# Patient Record
Sex: FEMALE | Race: ASIAN | URBAN - METROPOLITAN AREA
[De-identification: names, ages, dates, MRNs, and addresses within clinical notes are randomized per-mention and may not be internally consistent; named-entity substitution may affect disease eponyms.]

---

## 2022-09-06 ENCOUNTER — OFFICE VISIT (OUTPATIENT)
Dept: INTERNAL MEDICINE CLINIC | Facility: CLINIC | Age: 32
End: 2022-09-06
Payer: COMMERCIAL

## 2022-09-06 VITALS
BODY MASS INDEX: 29.78 KG/M2 | HEIGHT: 59.5 IN | DIASTOLIC BLOOD PRESSURE: 78 MMHG | OXYGEN SATURATION: 98 % | TEMPERATURE: 98 F | WEIGHT: 149.69 LBS | SYSTOLIC BLOOD PRESSURE: 124 MMHG | HEART RATE: 85 BPM

## 2022-09-06 DIAGNOSIS — J45.21 MILD INTERMITTENT ASTHMA WITH EXACERBATION: Primary | ICD-10-CM

## 2022-09-06 PROCEDURE — 99203 OFFICE O/P NEW LOW 30 MIN: CPT | Performed by: INTERNAL MEDICINE

## 2022-09-06 PROCEDURE — 3008F BODY MASS INDEX DOCD: CPT | Performed by: INTERNAL MEDICINE

## 2022-09-06 PROCEDURE — 3074F SYST BP LT 130 MM HG: CPT | Performed by: INTERNAL MEDICINE

## 2022-09-06 PROCEDURE — 3078F DIAST BP <80 MM HG: CPT | Performed by: INTERNAL MEDICINE

## 2022-09-06 RX ORDER — FLUTICASONE FUROATE AND VILANTEROL 100; 25 UG/1; UG/1
1 POWDER RESPIRATORY (INHALATION) DAILY
COMMUNITY
Start: 2021-04-09 | End: 2022-09-06

## 2022-09-06 RX ORDER — MONTELUKAST SODIUM 10 MG/1
10 TABLET ORAL NIGHTLY
COMMUNITY
Start: 2022-06-03

## 2022-09-06 RX ORDER — LISINOPRIL 5 MG/1
5 TABLET ORAL DAILY
COMMUNITY
Start: 2022-04-01

## 2022-09-06 RX ORDER — ACYCLOVIR 400 MG/1
1 TABLET ORAL DAILY
COMMUNITY

## 2022-09-06 RX ORDER — FERROUS SULFATE 325(65) MG
1 TABLET ORAL 2 TIMES DAILY
COMMUNITY
Start: 2021-12-04 | End: 2022-12-04

## 2022-09-06 RX ORDER — LISINOPRIL 5 MG/1
5 TABLET ORAL DAILY
COMMUNITY
Start: 2022-08-06 | End: 2023-08-06

## 2022-09-06 RX ORDER — MONTELUKAST SODIUM 10 MG/1
10 TABLET ORAL NIGHTLY
COMMUNITY
Start: 2021-04-18 | End: 2022-09-06

## 2022-09-06 RX ORDER — FLUTICASONE FUROATE AND VILANTEROL 100; 25 UG/1; UG/1
1 POWDER RESPIRATORY (INHALATION) DAILY
Qty: 1 EACH | Refills: 2 | Status: SHIPPED | OUTPATIENT
Start: 2022-09-06

## 2022-09-06 RX ORDER — ALBUTEROL SULFATE 90 UG/1
2 AEROSOL, METERED RESPIRATORY (INHALATION) EVERY 4 HOURS PRN
COMMUNITY
Start: 2021-12-02

## 2022-09-06 RX ORDER — MONTELUKAST SODIUM 10 MG/1
10 TABLET ORAL NIGHTLY
Qty: 90 TABLET | Refills: 0 | Status: SHIPPED | OUTPATIENT
Start: 2022-09-06

## 2022-09-06 RX ORDER — ALBUTEROL SULFATE 1.25 MG/3ML
1 SOLUTION RESPIRATORY (INHALATION) AS NEEDED
COMMUNITY

## 2022-09-06 RX ORDER — CIPROFLOXACIN 250 MG/1
250 TABLET, FILM COATED ORAL EVERY 12 HOURS
COMMUNITY
Start: 2022-06-03

## 2022-09-08 ENCOUNTER — TELEPHONE (OUTPATIENT)
Dept: INTERNAL MEDICINE CLINIC | Facility: CLINIC | Age: 32
End: 2022-09-08

## 2022-09-08 DIAGNOSIS — R05.1 ACUTE COUGH: Primary | ICD-10-CM

## 2022-09-08 RX ORDER — AZITHROMYCIN 250 MG/1
TABLET, FILM COATED ORAL
Qty: 6 TABLET | Refills: 0 | Status: SHIPPED | OUTPATIENT
Start: 2022-09-08 | End: 2022-09-13

## 2022-09-08 NOTE — TELEPHONE ENCOUNTER
Spoke to patient (name and  of patient verified). She reports she has been taking Mucinex, cough syrup, and the inhaler as prescribed with no improvement in her symptoms. She reports she is still coughing up yellow phlegm. She denies fever. The 22 Covid PCR test was negative. She did a Covid home test yesterday and today, both negative. Patient reports she uses a humidifier, air purifier, hot steam showers, holds her face over boiling steaming water with eucalyptus oil. Patient also reports elevating her head at night with three pillows. Dr. Ana Maria Lai, please advise any recommendations for patient's persistent symptoms with negative Covid tests.

## 2022-11-08 RX ORDER — LISINOPRIL 5 MG/1
TABLET ORAL
Qty: 90 TABLET | Refills: 0 | Status: SHIPPED | OUTPATIENT
Start: 2022-11-08

## 2022-11-08 NOTE — TELEPHONE ENCOUNTER
Should come in for ov for ffup for her hypertension; I just saw her before for an acute/urgent visit .  She may need also her annual physical if not done in the alst 12 mosl

## 2022-11-09 NOTE — TELEPHONE ENCOUNTER
Left message for pt to call back to inform that Dr Shasta Dominguez refilled her blood pressure medicine but does want to see her in office for a physical exam, when she was seen in September it was for an acute visit and her blood pressure was not addressed.

## 2022-12-19 ENCOUNTER — TELEMEDICINE (OUTPATIENT)
Dept: INTERNAL MEDICINE CLINIC | Facility: CLINIC | Age: 32
End: 2022-12-19

## 2022-12-19 ENCOUNTER — NURSE TRIAGE (OUTPATIENT)
Dept: INTERNAL MEDICINE CLINIC | Facility: CLINIC | Age: 32
End: 2022-12-19

## 2022-12-19 DIAGNOSIS — U07.1 COVID-19 VIRUS INFECTION: Primary | ICD-10-CM

## 2022-12-19 DIAGNOSIS — J45.21 MILD INTERMITTENT ASTHMA WITH EXACERBATION: ICD-10-CM

## 2022-12-19 PROCEDURE — 99214 OFFICE O/P EST MOD 30 MIN: CPT | Performed by: INTERNAL MEDICINE

## 2022-12-19 RX ORDER — NIRMATRELVIR AND RITONAVIR 300-100 MG
KIT ORAL
Qty: 30 TABLET | Refills: 0 | Status: SHIPPED | OUTPATIENT
Start: 2022-12-19 | End: 2022-12-24

## 2022-12-20 ENCOUNTER — TELEPHONE (OUTPATIENT)
Dept: INTERNAL MEDICINE CLINIC | Facility: CLINIC | Age: 32
End: 2022-12-20

## 2022-12-20 NOTE — TELEPHONE ENCOUNTER
Stop paxlovid as advised. Continue sympotmatic treatment; ER if any worsening of symptoms. CDC has not recommended using zithromax againstr covid so we dont prescribve abx for covid whichi is a viral infection.

## 2022-12-20 NOTE — TELEPHONE ENCOUNTER
Patient calling ( identified name and  ) states took Paxlovid yesterday  ( 3 tablets at once ) at 8pm last night     Noticed within 10 minutes of taking the Paxlovid  that her face, chin , neck became red and itching, had hives  ( small bumps )  Took Zyrtec and received some relief     Today has slight redness to her neck, skin is very dry and peeling , bumps to skin have decreased    Denies SOB, no throat issues       States her COVID  Symptoms are slightly  improved since yesterday , has been taking Mucinex     Has not taken any further doses at this time, advised to NOT take any more doses of Paxlovid        ( Reports last time she had COVID  In  she had Z pack with good results  )        Allergies reviewed and pharmacy confirmed      Please advise and thank you.

## 2023-01-20 ENCOUNTER — OFFICE VISIT (OUTPATIENT)
Dept: INTERNAL MEDICINE CLINIC | Facility: CLINIC | Age: 33
End: 2023-01-20

## 2023-01-20 VITALS
HEART RATE: 89 BPM | SYSTOLIC BLOOD PRESSURE: 126 MMHG | BODY MASS INDEX: 28.66 KG/M2 | DIASTOLIC BLOOD PRESSURE: 76 MMHG | OXYGEN SATURATION: 99 % | HEIGHT: 60 IN | WEIGHT: 146 LBS

## 2023-01-20 DIAGNOSIS — J45.21 MILD INTERMITTENT ASTHMA WITH EXACERBATION: ICD-10-CM

## 2023-01-20 DIAGNOSIS — Z00.00 ANNUAL PHYSICAL EXAM: Primary | ICD-10-CM

## 2023-01-20 DIAGNOSIS — Z01.419 WELL FEMALE EXAM WITH ROUTINE GYNECOLOGICAL EXAM: ICD-10-CM

## 2023-01-20 DIAGNOSIS — R05.1 ACUTE COUGH: ICD-10-CM

## 2023-01-20 DIAGNOSIS — I10 PRIMARY HYPERTENSION: ICD-10-CM

## 2023-01-20 PROCEDURE — 3008F BODY MASS INDEX DOCD: CPT | Performed by: INTERNAL MEDICINE

## 2023-01-20 PROCEDURE — 99395 PREV VISIT EST AGE 18-39: CPT | Performed by: INTERNAL MEDICINE

## 2023-01-20 PROCEDURE — 3074F SYST BP LT 130 MM HG: CPT | Performed by: INTERNAL MEDICINE

## 2023-01-20 PROCEDURE — 3078F DIAST BP <80 MM HG: CPT | Performed by: INTERNAL MEDICINE

## 2023-01-20 RX ORDER — NIFEDIPINE 30 MG/1
30 TABLET, FILM COATED, EXTENDED RELEASE ORAL DAILY
Qty: 30 TABLET | Refills: 2 | Status: SHIPPED | OUTPATIENT
Start: 2023-01-20

## 2023-01-20 RX ORDER — CODEINE PHOSPHATE AND GUAIFENESIN 10; 100 MG/5ML; MG/5ML
5 SOLUTION ORAL EVERY 6 HOURS PRN
Qty: 120 ML | Refills: 0 | Status: SHIPPED | OUTPATIENT
Start: 2023-01-20

## 2023-01-20 RX ORDER — AZITHROMYCIN 250 MG/1
TABLET, FILM COATED ORAL
Qty: 6 TABLET | Refills: 0 | Status: SHIPPED | OUTPATIENT
Start: 2023-01-20 | End: 2023-01-25

## 2023-01-20 RX ORDER — FLUTICASONE FUROATE AND VILANTEROL 100; 25 UG/1; UG/1
1 POWDER RESPIRATORY (INHALATION) DAILY
Qty: 1 EACH | Refills: 2 | Status: SHIPPED | OUTPATIENT
Start: 2023-01-20

## 2023-03-15 ENCOUNTER — OFFICE VISIT (OUTPATIENT)
Dept: INTERNAL MEDICINE CLINIC | Facility: CLINIC | Age: 33
End: 2023-03-15

## 2023-03-15 ENCOUNTER — LAB ENCOUNTER (OUTPATIENT)
Dept: LAB | Age: 33
End: 2023-03-15
Attending: INTERNAL MEDICINE
Payer: COMMERCIAL

## 2023-03-15 ENCOUNTER — TELEPHONE (OUTPATIENT)
Dept: INTERNAL MEDICINE CLINIC | Facility: CLINIC | Age: 33
End: 2023-03-15

## 2023-03-15 VITALS
TEMPERATURE: 97 F | WEIGHT: 149.13 LBS | OXYGEN SATURATION: 99 % | DIASTOLIC BLOOD PRESSURE: 76 MMHG | HEART RATE: 84 BPM | SYSTOLIC BLOOD PRESSURE: 124 MMHG | HEIGHT: 60 IN | BODY MASS INDEX: 29.28 KG/M2

## 2023-03-15 DIAGNOSIS — R49.0 HOARSENESS: ICD-10-CM

## 2023-03-15 DIAGNOSIS — I10 PRIMARY HYPERTENSION: Primary | ICD-10-CM

## 2023-03-15 DIAGNOSIS — R00.2 PALPITATIONS: ICD-10-CM

## 2023-03-15 DIAGNOSIS — M54.2 NECK PAIN: ICD-10-CM

## 2023-03-15 DIAGNOSIS — Z00.00 ANNUAL PHYSICAL EXAM: ICD-10-CM

## 2023-03-15 LAB
ALBUMIN SERPL-MCNC: 3.9 G/DL (ref 3.4–5)
ALBUMIN/GLOB SERPL: 1 {RATIO} (ref 1–2)
ALP LIVER SERPL-CCNC: 57 U/L
ALT SERPL-CCNC: 32 U/L
ANION GAP SERPL CALC-SCNC: 8 MMOL/L (ref 0–18)
AST SERPL-CCNC: 20 U/L (ref 15–37)
ATRIAL RATE: 75 BPM
BASOPHILS # BLD AUTO: 0.05 X10(3) UL (ref 0–0.2)
BASOPHILS NFR BLD AUTO: 0.8 %
BILIRUB SERPL-MCNC: 0.3 MG/DL (ref 0.1–2)
BUN BLD-MCNC: 14 MG/DL (ref 7–18)
BUN/CREAT SERPL: 17.7 (ref 10–20)
CALCIUM BLD-MCNC: 9.7 MG/DL (ref 8.5–10.1)
CHLORIDE SERPL-SCNC: 105 MMOL/L (ref 98–112)
CHOLEST SERPL-MCNC: 184 MG/DL (ref ?–200)
CO2 SERPL-SCNC: 27 MMOL/L (ref 21–32)
CREAT BLD-MCNC: 0.79 MG/DL
DEPRECATED RDW RBC AUTO: 37.2 FL (ref 35.1–46.3)
EOSINOPHIL # BLD AUTO: 0.17 X10(3) UL (ref 0–0.7)
EOSINOPHIL NFR BLD AUTO: 2.7 %
ERYTHROCYTE [DISTWIDTH] IN BLOOD BY AUTOMATED COUNT: 13.7 % (ref 11–15)
EST. AVERAGE GLUCOSE BLD GHB EST-MCNC: 128 MG/DL (ref 68–126)
FASTING PATIENT LIPID ANSWER: YES
FASTING STATUS PATIENT QL REPORTED: YES
GFR SERPLBLD BASED ON 1.73 SQ M-ARVRAT: 102 ML/MIN/1.73M2 (ref 60–?)
GLOBULIN PLAS-MCNC: 3.8 G/DL (ref 2.8–4.4)
GLUCOSE BLD-MCNC: 101 MG/DL (ref 70–99)
HBA1C MFR BLD: 6.1 % (ref ?–5.7)
HCT VFR BLD AUTO: 36 %
HDLC SERPL-MCNC: 48 MG/DL (ref 40–59)
HGB BLD-MCNC: 11 G/DL
IMM GRANULOCYTES # BLD AUTO: 0.01 X10(3) UL (ref 0–1)
IMM GRANULOCYTES NFR BLD: 0.2 %
LDLC SERPL CALC-MCNC: 121 MG/DL (ref ?–100)
LYMPHOCYTES # BLD AUTO: 1.91 X10(3) UL (ref 1–4)
LYMPHOCYTES NFR BLD AUTO: 30.5 %
MCH RBC QN AUTO: 23 PG (ref 26–34)
MCHC RBC AUTO-ENTMCNC: 30.6 G/DL (ref 31–37)
MCV RBC AUTO: 75.3 FL
MONOCYTES # BLD AUTO: 0.42 X10(3) UL (ref 0.1–1)
MONOCYTES NFR BLD AUTO: 6.7 %
NEUTROPHILS # BLD AUTO: 3.7 X10 (3) UL (ref 1.5–7.7)
NEUTROPHILS # BLD AUTO: 3.7 X10(3) UL (ref 1.5–7.7)
NEUTROPHILS NFR BLD AUTO: 59.1 %
NONHDLC SERPL-MCNC: 136 MG/DL (ref ?–130)
OSMOLALITY SERPL CALC.SUM OF ELEC: 291 MOSM/KG (ref 275–295)
P AXIS: 0 DEGREES
P-R INTERVAL: 142 MS
PLATELET # BLD AUTO: 390 10(3)UL (ref 150–450)
POTASSIUM SERPL-SCNC: 4.2 MMOL/L (ref 3.5–5.1)
PROT SERPL-MCNC: 7.7 G/DL (ref 6.4–8.2)
Q-T INTERVAL: 388 MS
QRS DURATION: 82 MS
QTC CALCULATION (BEZET): 433 MS
R AXIS: 58 DEGREES
RBC # BLD AUTO: 4.78 X10(6)UL
SODIUM SERPL-SCNC: 140 MMOL/L (ref 136–145)
T AXIS: 40 DEGREES
TRIGL SERPL-MCNC: 80 MG/DL (ref 30–149)
TSI SER-ACNC: 0.65 MIU/ML (ref 0.36–3.74)
VENTRICULAR RATE: 75 BPM
VLDLC SERPL CALC-MCNC: 14 MG/DL (ref 0–30)
WBC # BLD AUTO: 6.3 X10(3) UL (ref 4–11)

## 2023-03-15 PROCEDURE — 93000 ELECTROCARDIOGRAM COMPLETE: CPT | Performed by: INTERNAL MEDICINE

## 2023-03-15 PROCEDURE — 99214 OFFICE O/P EST MOD 30 MIN: CPT | Performed by: INTERNAL MEDICINE

## 2023-03-15 PROCEDURE — 3078F DIAST BP <80 MM HG: CPT | Performed by: INTERNAL MEDICINE

## 2023-03-15 PROCEDURE — 90471 IMMUNIZATION ADMIN: CPT | Performed by: INTERNAL MEDICINE

## 2023-03-15 PROCEDURE — 83036 HEMOGLOBIN GLYCOSYLATED A1C: CPT

## 2023-03-15 PROCEDURE — 84443 ASSAY THYROID STIM HORMONE: CPT

## 2023-03-15 PROCEDURE — 3074F SYST BP LT 130 MM HG: CPT | Performed by: INTERNAL MEDICINE

## 2023-03-15 PROCEDURE — 85025 COMPLETE CBC W/AUTO DIFF WBC: CPT

## 2023-03-15 PROCEDURE — 36415 COLL VENOUS BLD VENIPUNCTURE: CPT

## 2023-03-15 PROCEDURE — 90677 PCV20 VACCINE IM: CPT | Performed by: INTERNAL MEDICINE

## 2023-03-15 PROCEDURE — 80061 LIPID PANEL: CPT

## 2023-03-15 PROCEDURE — 3008F BODY MASS INDEX DOCD: CPT | Performed by: INTERNAL MEDICINE

## 2023-03-15 PROCEDURE — 80053 COMPREHEN METABOLIC PANEL: CPT

## 2023-03-15 NOTE — TELEPHONE ENCOUNTER
Patient is calling and states that she called to schedule her echocardiogram and they didn't have any openings until May. She was informed they have the heart monitor in March. She wants to know if you want her to do the echo first or the heart monitor.

## 2023-03-19 ENCOUNTER — TELEPHONE (OUTPATIENT)
Dept: INTERNAL MEDICINE CLINIC | Facility: CLINIC | Age: 33
End: 2023-03-19

## 2023-03-19 DIAGNOSIS — D50.9 MICROCYTIC ANEMIA: Primary | ICD-10-CM

## 2023-03-20 ENCOUNTER — HOSPITAL ENCOUNTER (OUTPATIENT)
Dept: CV DIAGNOSTICS | Facility: HOSPITAL | Age: 33
Discharge: HOME | End: 2023-03-20
Attending: INTERNAL MEDICINE
Payer: COMMERCIAL

## 2023-03-20 DIAGNOSIS — R00.2 PALPITATIONS: ICD-10-CM

## 2023-03-20 PROCEDURE — 93243 EXT ECG>48HR<7D SCAN A/R: CPT | Performed by: INTERNAL MEDICINE

## 2023-03-20 PROCEDURE — 93242 EXT ECG>48HR<7D RECORDING: CPT | Performed by: INTERNAL MEDICINE

## 2023-04-24 ENCOUNTER — HOSPITAL ENCOUNTER (OUTPATIENT)
Dept: GENERAL RADIOLOGY | Age: 33
Discharge: HOME | End: 2023-04-24
Attending: NURSE PRACTITIONER
Payer: COMMERCIAL

## 2023-04-24 ENCOUNTER — TELEPHONE (OUTPATIENT)
Dept: INTERNAL MEDICINE CLINIC | Facility: CLINIC | Age: 33
End: 2023-04-24

## 2023-04-24 ENCOUNTER — OFFICE VISIT (OUTPATIENT)
Dept: INTERNAL MEDICINE CLINIC | Facility: CLINIC | Age: 33
End: 2023-04-24

## 2023-04-24 VITALS
RESPIRATION RATE: 16 BRPM | DIASTOLIC BLOOD PRESSURE: 85 MMHG | HEART RATE: 90 BPM | TEMPERATURE: 98 F | HEIGHT: 60 IN | SYSTOLIC BLOOD PRESSURE: 145 MMHG | OXYGEN SATURATION: 99 % | WEIGHT: 150 LBS | BODY MASS INDEX: 29.45 KG/M2

## 2023-04-24 DIAGNOSIS — I10 PRIMARY HYPERTENSION: ICD-10-CM

## 2023-04-24 DIAGNOSIS — J45.21 MILD INTERMITTENT ASTHMA WITH EXACERBATION: Primary | ICD-10-CM

## 2023-04-24 DIAGNOSIS — J45.21 MILD INTERMITTENT ASTHMA WITH EXACERBATION: ICD-10-CM

## 2023-04-24 PROCEDURE — 71046 X-RAY EXAM CHEST 2 VIEWS: CPT | Performed by: NURSE PRACTITIONER

## 2023-04-24 RX ORDER — AZITHROMYCIN 250 MG/1
TABLET, FILM COATED ORAL
Qty: 6 TABLET | Refills: 0 | Status: SHIPPED | OUTPATIENT
Start: 2023-04-24 | End: 2023-04-29

## 2023-04-24 RX ORDER — MONTELUKAST SODIUM 10 MG/1
10 TABLET ORAL NIGHTLY
Qty: 90 TABLET | Refills: 0 | Status: SHIPPED | OUTPATIENT
Start: 2023-04-24

## 2023-04-24 RX ORDER — FLUTICASONE FUROATE AND VILANTEROL 100; 25 UG/1; UG/1
1 POWDER RESPIRATORY (INHALATION) DAILY
Qty: 1 EACH | Refills: 1 | Status: SHIPPED | OUTPATIENT
Start: 2023-04-24

## 2023-04-24 NOTE — TELEPHONE ENCOUNTER
Pt has appointment in person today at 2pm.  Pt reports flu like symptoms started yesterday. Temp 99F, cough, runny nose. Covid test negative  Pt has been monitoring her heart rate and blood pressure. Yesterday 98/54, today 100/60. Heart rate  Highest 135. Hr now 98  No dizziness. Able to speak in complete sentences. Pt tries not to use her inhaler as much. Informed ok to keep appointment today. Pt verbalized understanding.     Future Appointments   Date Time Provider Stephan Villafana   4/24/2023  2:00 PM Juan Antonio Mccoy, [place]

## 2023-05-12 ENCOUNTER — OFFICE VISIT (OUTPATIENT)
Dept: INTERNAL MEDICINE CLINIC | Facility: CLINIC | Age: 33
End: 2023-05-12

## 2023-05-12 ENCOUNTER — HOSPITAL ENCOUNTER (OUTPATIENT)
Dept: GENERAL RADIOLOGY | Facility: HOSPITAL | Age: 33
Discharge: HOME | End: 2023-05-12
Attending: INTERNAL MEDICINE
Payer: COMMERCIAL

## 2023-05-12 VITALS
DIASTOLIC BLOOD PRESSURE: 86 MMHG | HEIGHT: 60 IN | BODY MASS INDEX: 29.41 KG/M2 | SYSTOLIC BLOOD PRESSURE: 132 MMHG | HEART RATE: 93 BPM | WEIGHT: 149.81 LBS | OXYGEN SATURATION: 97 %

## 2023-05-12 DIAGNOSIS — M79.671 RIGHT FOOT PAIN: Primary | ICD-10-CM

## 2023-05-12 DIAGNOSIS — M79.671 RIGHT FOOT PAIN: ICD-10-CM

## 2023-05-12 PROCEDURE — 3075F SYST BP GE 130 - 139MM HG: CPT | Performed by: INTERNAL MEDICINE

## 2023-05-12 PROCEDURE — 73630 X-RAY EXAM OF FOOT: CPT | Performed by: INTERNAL MEDICINE

## 2023-05-12 PROCEDURE — 99213 OFFICE O/P EST LOW 20 MIN: CPT | Performed by: INTERNAL MEDICINE

## 2023-05-12 PROCEDURE — 3079F DIAST BP 80-89 MM HG: CPT | Performed by: INTERNAL MEDICINE

## 2023-05-12 PROCEDURE — 3008F BODY MASS INDEX DOCD: CPT | Performed by: INTERNAL MEDICINE

## 2023-05-12 RX ORDER — NAPROXEN 500 MG/1
500 TABLET ORAL 2 TIMES DAILY WITH MEALS
Qty: 30 TABLET | Refills: 0 | Status: SHIPPED | OUTPATIENT
Start: 2023-05-12 | End: 2024-05-06

## 2023-05-12 NOTE — PATIENT INSTRUCTIONS
Await results of right foot x-ray. Please rest and apply ice to your right foot 2-3 times daily, and take naproxen 500 mg twice daily with meals. Call if no better.

## 2023-05-23 ENCOUNTER — OFFICE VISIT (OUTPATIENT)
Dept: OBGYN CLINIC | Facility: CLINIC | Age: 33
End: 2023-05-23

## 2023-05-23 VITALS
BODY MASS INDEX: 29 KG/M2 | SYSTOLIC BLOOD PRESSURE: 134 MMHG | DIASTOLIC BLOOD PRESSURE: 90 MMHG | HEART RATE: 84 BPM | WEIGHT: 148 LBS

## 2023-05-23 DIAGNOSIS — Z01.411 ENCOUNTER FOR GYNECOLOGICAL EXAMINATION WITH ABNORMAL FINDING: Primary | ICD-10-CM

## 2023-05-23 DIAGNOSIS — N63.11 MASS OF UPPER OUTER QUADRANT OF RIGHT BREAST: ICD-10-CM

## 2023-05-23 PROBLEM — Z01.419 WELL FEMALE EXAM WITH ROUTINE GYNECOLOGICAL EXAM: Status: RESOLVED | Noted: 2023-01-20 | Resolved: 2023-05-23

## 2023-05-23 PROCEDURE — 3075F SYST BP GE 130 - 139MM HG: CPT | Performed by: OBSTETRICS & GYNECOLOGY

## 2023-05-23 PROCEDURE — 3080F DIAST BP >= 90 MM HG: CPT | Performed by: OBSTETRICS & GYNECOLOGY

## 2023-05-23 PROCEDURE — 99385 PREV VISIT NEW AGE 18-39: CPT | Performed by: OBSTETRICS & GYNECOLOGY

## 2023-05-23 NOTE — PROGRESS NOTES
Robyn Ying is a 33year old female [facility] Patient's last menstrual period was 2023 (exact date). Patient presents with:  Gyn Exam: NP, Annual -- thinking of IVF in  next 6-12 months -- female partner  . OBSTETRICS HISTORY:     OB History    Para Term  AB Living   0 0 0 0 0 0   SAB IAB Ectopic Multiple Live Births   0 0 0 0 0       GYNE HISTORY:     Periods regular monthly      Sexual activity:   Not on file        Pap Date: 21  Pap Result Notes: PAP NEG           View : No data to display. MEDICAL HISTORY:     Past Medical History:   Diagnosis Date    Allergic rhinitis     Asthma     Essential hypertension     Hyperlipidemia     Prediabetes      No past surgical history on file. OB History     T0    L0    SAB0  IAB0  Ectopic0  Multiple0  Live Births0      SOCIAL HISTORY:     Tobacco Use: Medium Risk (2023)      Patient History          Smoking Tobacco Use: Former          Smokeless Tobacco Use: Never          Passive Exposure: Not on file    FAMILY HISTORY:     Family History   Problem Relation Age of Onset    Lipids Father     Hypertension Father     Lipids Mother     Hypertension Mother     Diabetes Brother     Heart Disorder Brother         CAD    Diabetes Maternal Aunt          MEDICATIONS:       Current Outpatient Medications:     naproxen 500 MG Oral Tab, Take 1 tablet (500 mg total) by mouth 2 (two) times daily with meals. , Disp: 30 tablet, Rfl: 0    fluticasone furoate-vilanterol 100-25 MCG/ACT Inhalation Aerosol Powder, Breath Activated, Inhale 1 puff into the lungs daily. , Disp: 1 each, Rfl: 1    montelukast 10 MG Oral Tab, Take 1 tablet (10 mg total) by mouth nightly. TAKE AT BEDTIME, Disp: 90 tablet, Rfl: 0    NIFEdipine ER 30 MG Oral Tablet 24 Hr, Take 1 tablet (30 mg total) by mouth daily. , Disp: 30 tablet, Rfl: 2    Albuterol Sulfate 1.25 MG/3ML Inhalation Nebu Soln, 3 mL (1.25 mg total) as needed. , Disp: , Rfl: albuterol 108 (90 Base) MCG/ACT Inhalation Aero Soln, Inhale 2 puffs into the lungs every 4 (four) hours as needed. , Disp: , Rfl:     Cholecalciferol 25 MCG (1000 UT) Oral Tab, Take 1 tablet (1,000 Units total) by mouth daily. , Disp: , Rfl:     ALLERGIES:       Amoxicillin             HIVES, ITCHING, RASH, SWELLING,                            WHEEZING  Cat Hair Extract        Coughing, ITCHING, RASH, SHORTNESS                            OF BREATH, SWELLING, WHEEZING  Latex                   ITCHING, RASH, SWELLING  Shrimp Flavor           SWELLING    Comment:Some seafood  Seafood                 ITCHING, RASH      REVIEW OF SYSTEMS:     Constitutional:    denies fever / chills  Eyes:     denies blurred or double vision  Cardiovascular:  denies chest pain or palpitations  Respiratory:    denies shortness of breath  Gastrointestinal:  denies severe abdominal pain, frequent diarrhea or constipation, nausea / vomiting  Genitourinary:    denies dysuria, bothersome incontinence  Skin/Breast:   denies any breast pain, lumps, or discharge  Neurological:    denies frequent severe headaches  Psychiatric:   denies depression or anxiety, thoughts of harming self or others  Heme/Lymph:    denies easy bruising or bleeding      PHYSICAL EXAM:   Blood pressure 134/90, pulse 84, weight 148 lb (67.1 kg), last menstrual period 05/03/2023. Constitutional:  well developed, well nourished  Head/Face:  normocephalic  Neck/Thyroid: thyroid symmetric, no thyromegaly, no nodules, no adenopathy  Lymphatic: no abnormal supraclavicular or axillary adenopathy is noted  Breast:   normal without palpable masses, tenderness, asymmetry, nipple discharge, nipple retraction or skin changes (+) vague nodule     Abdomen:   soft, nontender, nondistended, no masses  Skin/Hair:  no unusual rashes or bruises  Extremities:  no edema, no cyanosis  Psychiatric:   oriented to time, place, person and situation.  Appropriate mood and affect    Pelvic Exam:  External Genitalia:  normal appearance, hair distribution, and no lesions  Urethral Meatus:   normal in size, location, without lesions and prolapse  Bladder:    no fullness, masses or tenderness  Vagina:    normal appearance without lesions, no abnormal discharge  Cervix:    normal without tenderness on motion  Uterus:    normal in size, contour, position, mobility, without tenderness  Adnexa:   normal without masses or tenderness  Perineum:   normal  Anus: no hemorroids         ASSESSMENT & PLAN:     Carl Mccartney was seen today for gyn exam.    Diagnoses and all orders for this visit:    Encounter for gynecological examination with abnormal finding    Mass of upper outer quadrant of right breast    Breast lump may be due to upcoming period. Recheck in 2 wks    SUMMARY:  Pap: Next cotest 4/24-26 per ASCCP guidelines. BCM:  None  STD screening: declines  Mammogram: n/a -- once 37 yrs old  HM updated  Depression screen:   Depression Screening (PHQ-2/PHQ-9): Over the LAST 2 WEEKS   Little interest or pleasure in doing things: Not at all    Feeling down, depressed, or hopeless: Not at all    PHQ-2 SCORE: 0          FOLLOW-UP     Return in about 2 weeks (around 6/6/2023) for breast exam.    Note to patient and family:  The [facility] makes medical notes available to patients in the interest of transparency. However, please be advised that this is a medical document. It is intended as a peer to peer communication. It is written in medical language and may contain abbreviations or verbiage that are technical and unfamiliar. It may appear blunt or direct. Medical documents are intended to carry relevant information, facts as evident, and the clinical opinion of the practitioner.

## 2023-06-02 ENCOUNTER — HOSPITAL ENCOUNTER (OUTPATIENT)
Dept: CV DIAGNOSTICS | Facility: HOSPITAL | Age: 33
Discharge: HOME | End: 2023-06-02
Attending: INTERNAL MEDICINE
Payer: COMMERCIAL

## 2023-06-02 DIAGNOSIS — R00.2 PALPITATIONS: ICD-10-CM

## 2023-06-02 PROCEDURE — 93306 TTE W/DOPPLER COMPLETE: CPT | Performed by: INTERNAL MEDICINE

## 2023-06-06 NOTE — TELEPHONE ENCOUNTER
Spoke with pt, verified , informed of Dr Angelica Malloy message below, pt verbalized understanding. 4 = No assist / stand by assistance

## 2023-07-03 RX ORDER — NIFEDIPINE 30 MG/1
30 TABLET, FILM COATED, EXTENDED RELEASE ORAL DAILY
Qty: 30 TABLET | Refills: 2 | Status: CANCELLED | OUTPATIENT
Start: 2023-07-03

## 2023-07-04 ENCOUNTER — TELEPHONE (OUTPATIENT)
Dept: INTERNAL MEDICINE CLINIC | Facility: CLINIC | Age: 33
End: 2023-07-04

## 2023-07-04 NOTE — TELEPHONE ENCOUNTER
Please review; protocol failed/no protocol. Requested Prescriptions   Pending Prescriptions Disp Refills    NIFEDIPINE ER 30 MG Oral Tablet 24 Hr [Pharmacy Med Name: Nifedipine Er 24hr 30 Mg Tab Yolis] 30 tablet 0     Sig: Take 1 tablet (30 mg total) by mouth daily.        Hypertensive Medications Protocol Failed - 7/3/2023  5:31 PM        Failed - Last BP reading less than 140/90     BP Readings from Last 1 Encounters:  05/23/23 : 134/90              Passed - In person appointment in the past 12 or next 3 months     Recent Outpatient Visits              1 month ago Encounter for gynecological examination with abnormal finding    [address], Kaleigh So MD    Office Visit    1 month ago Right foot pain    [address] [facility], Radha Lu MD    Office Visit    2 months ago Mild intermittent asthma with exacerbation    [facility], Ariana Durand, [place], [facility]    Office Visit    3 months ago Primary hypertension    Neil Grijalva MD    Office Visit    5 months ago Annual physical exam    Charles Grijalva MD    Office Visit                      Passed - CMP or BMP in past 6 months     Recent Results (from the past 4392 hour(s))   COMP METABOLIC PANEL (14)    Collection Time: 03/15/23  9:22 AM   Result Value Ref Range    Glucose 101 (H) 70 - 99 mg/dL    Sodium 140 136 - 145 mmol/L    Potassium 4.2 3.5 - 5.1 mmol/L    Chloride 105 98 - 112 mmol/L    CO2 27.0 21.0 - 32.0 mmol/L    Anion Gap 8 0 - 18 mmol/L    BUN 14 7 - 18 mg/dL    Creatinine 0.79 0.55 - 1.02 mg/dL    BUN/CREA Ratio 17.7 10.0 - 20.0    Calcium, Total 9.7 8.5 - 10.1 mg/dL    Calculated Osmolality 291 275 - 295 mOsm/kg    eGFR-Cr 102 >=60 mL/min/1.73m2    ALT 32 13 - 56 U/L    AST 20 15 - 37 U/L    Alkaline Phosphatase 57 37 - 98 U/L    Bilirubin, Total 0.3 0.1 - 2.0 mg/dL    Total Protein 7.7 6.4 - 8.2 g/dL    Albumin 3.9 3.4 - 5.0 g/dL    Globulin  3.8 2.8 - 4.4 g/dL    A/G Ratio 1.0 1.0 - 2.0    Patient Fasting for CMP? Yes      *Note: Due to a large number of results and/or encounters for the requested time period, some results have not been displayed. A complete set of results can be found in Results Review.                Passed - In person appointment or virtual visit in the past 6 months     Recent Outpatient Visits              1 month ago Encounter for gynecological examination with abnormal finding    [address] Virginia Schaeffer MD    Office Visit    1 month ago Right foot pain    Vince Rodriguez MD    Office Visit    2 months ago Mild intermittent asthma with exacerbation    [facility]    Office Visit    3 months ago Primary hypertension    [address] [place] [address], Jaleesa Cordero MD    Office Visit    5 months ago Annual physical exam    Doir Mcclain MD    Office Visit                      Passed - EGFRCR or GFRNAA > 50     GFR Evaluation  EGFRCR: 102 , resulted on 3/15/2023                Recent Outpatient Visits              1 month ago Encounter for gynecological examination with abnormal finding    [address] [facility],[address], [address] Virginia Schaeffer MD    Office Visit    1 month ago Right foot pain    Vince Rodriguez MD    Office Visit    2 months ago Mild intermittent asthma with exacerbation    [facility], [facility]    Office Visit    3 months ago Primary hypertension    [facility] Jenniffer Choudhury MD    Office Visit    5 months ago Annual physical exam    [address], Zheng Childers MD    Office Visit

## 2023-07-05 RX ORDER — NIFEDIPINE 30 MG/1
30 TABLET, FILM COATED, EXTENDED RELEASE ORAL DAILY
Qty: 90 TABLET | Refills: 0 | Status: SHIPPED | OUTPATIENT
Start: 2023-07-05

## 2023-07-06 ENCOUNTER — PATIENT OUTREACH (OUTPATIENT)
Dept: CASE MANAGEMENT | Age: 33
End: 2023-07-06

## 2023-07-06 NOTE — PROCEDURES
The office order for PCP removal request is Approved and finalized on July 6, 2023.     Thanks,  [facility] [employer]

## 2023-09-02 ENCOUNTER — OFFICE VISIT (OUTPATIENT)
Dept: INTERNAL MEDICINE CLINIC | Facility: CLINIC | Age: 33
End: 2023-09-02

## 2023-09-02 VITALS
WEIGHT: 145.88 LBS | SYSTOLIC BLOOD PRESSURE: 128 MMHG | DIASTOLIC BLOOD PRESSURE: 80 MMHG | HEART RATE: 80 BPM | TEMPERATURE: 98 F | BODY MASS INDEX: 28.64 KG/M2 | HEIGHT: 60 IN | OXYGEN SATURATION: 99 %

## 2023-09-02 DIAGNOSIS — M79.671 RIGHT FOOT PAIN: Primary | ICD-10-CM

## 2023-09-02 DIAGNOSIS — J01.90 ACUTE NON-RECURRENT SINUSITIS, UNSPECIFIED LOCATION: ICD-10-CM

## 2023-09-02 PROCEDURE — 99213 OFFICE O/P EST LOW 20 MIN: CPT | Performed by: INTERNAL MEDICINE

## 2023-09-02 PROCEDURE — 3008F BODY MASS INDEX DOCD: CPT | Performed by: INTERNAL MEDICINE

## 2023-09-02 PROCEDURE — 3074F SYST BP LT 130 MM HG: CPT | Performed by: INTERNAL MEDICINE

## 2023-09-02 PROCEDURE — 3079F DIAST BP 80-89 MM HG: CPT | Performed by: INTERNAL MEDICINE

## 2023-10-06 RX ORDER — NIFEDIPINE 30 MG/1
30 TABLET, FILM COATED, EXTENDED RELEASE ORAL DAILY
Qty: 90 TABLET | Refills: 1 | Status: SHIPPED | OUTPATIENT
Start: 2023-10-06

## 2023-10-06 NOTE — TELEPHONE ENCOUNTER
Please Review. Protocol Failed or has no protocol.       Requested Prescriptions   Pending Prescriptions Disp Refills    NIFEDIPINE ER 30 MG Oral Tablet 24 Hr [Pharmacy Med Name: Nifedipine Er 24hr 30 Mg Tab Yolis] 90 tablet 0     Sig: Take 1 tablet (30 mg total) by mouth daily.       Hypertensive Medications Protocol Failed - 10/4/2023  9:40 AM        Failed - CMP or BMP in past 6 months     No results found for this or any previous visit (from the past 4392 hour(s)).            Passed - In person appointment in the past 12 or next 3 months     Recent Outpatient Visits              1 month ago Right foot pain    [facility] Sarbjit De La Rosa MD    Office Visit    4 months ago Encounter for gynecological examination with abnormal finding    [facility] Jazmine Mercedes MD    Office Visit    4 months ago Right foot pain    [facility] Jeffrey Wakefield MD    Office Visit    5 months ago Mild intermittent asthma with exacerbation    [facility] Reba Ross APRN    Office Visit    6 months ago Primary hypertension    [facility] Sarbjit De La Rosa MD    Office Visit          Future Appointments         Provider Department Appt Notes    In 3 weeks Rosy Grayson DPM [facility] RT FOOT PAIN                    Passed - Last BP reading less than 140/90     BP Readings from Last 1 Encounters:  09/02/23 : 128/80              Passed - In person appointment or virtual visit in the past 6 months     Recent Outpatient Visits              1 month ago Right foot pain    [facility] Sarbjit De La Rosa MD    Office Visit    4 months ago Encounter for gynecological examination with abnormal finding     [facility] - [facility] Jazmine Mercedes MD    Office Visit    4 months ago Right foot pain    [facility] Jeffrey Wakefield MD    Office Visit    5 months ago Mild intermittent asthma with exacerbation    [facility] Reba Ross APRN    Office Visit    6 months ago Primary hypertension    [facility] Sarbjit De La Rosa MD    Office Visit          Future Appointments         Provider Department Appt Notes    In 3 weeks Rosy Grayson DPM [facility] RT FOOT PAIN                    Passed - EGFRCR or GFRNAA > 50     GFR Evaluation  EGFRCR: 102 , resulted on 3/15/2023             Recent Outpatient Visits              1 month ago Right foot pain    [facility] Sarbjit De La Rosa MD    Office Visit    4 months ago Encounter for gynecological examination with abnormal finding    [facility] - [facility] Jazmine Mercedes MD    Office Visit    4 months ago Right foot pain    [facility] Jeffrey Wakefield MD    Office Visit    5 months ago Mild intermittent asthma with exacerbation    [facility] Reba Ross APRN    Office Visit    6 months ago Primary hypertension    [facility] Sarbjit De La Rosa MD    Office Visit          Future Appointments         Provider Department Appt Notes    In 3 weeks Rosy Grayson DPM [facility] RT FOOT PAIN

## 2023-10-27 ENCOUNTER — OFFICE VISIT (OUTPATIENT)
Dept: PODIATRY CLINIC | Facility: CLINIC | Age: 33
End: 2023-10-27

## 2023-10-27 DIAGNOSIS — M79.671 RIGHT FOOT PAIN: Primary | ICD-10-CM

## 2023-10-27 PROCEDURE — 99203 OFFICE O/P NEW LOW 30 MIN: CPT | Performed by: STUDENT IN AN ORGANIZED HEALTH CARE EDUCATION/TRAINING PROGRAM

## 2023-10-27 RX ORDER — METHYLPREDNISOLONE 4 MG/1
TABLET ORAL
Qty: 21 TABLET | Refills: 0 | Status: SHIPPED | OUTPATIENT
Start: 2023-10-27

## 2023-10-27 NOTE — PROGRESS NOTES
[address] [facility] Podiatry  Progress Note      Renetta Valente is a 33year old female. Patient presents with: Foot Pain: R  foot- Pt  has been having pain, onset 2 years. Denies any injury. Pt has xray and sates she might have a bone spur. Rates pain 4/10. Denies numbness or tingling. HPI:     Patient is a pleasant 50-year-old female who presents to clinic today for evaluation of right foot pain. She admits to pain on the right dorsal lateral midfoot with palpation pressure. Patient had x-rays done which show a posterior calcaneal spur. Patient has had some physical therapy treatments consisting of stretching and ultrasound modalities which have helped somewhat. She takes naproxen for pain as needed    Allergies: Amoxicillin, Cat Hair Extract, Latex, Shrimp Flavor, and Seafood    Current Outpatient Medications   Medication Sig Dispense Refill    methylPREDNISolone 4 MG Oral Tablet Therapy Pack Take per package insert (instructions). Take as directed on the box 21 tablet 0    NIFEdipine ER 30 MG Oral Tablet 24 Hr Take 1 tablet (30 mg total) by mouth daily. 90 tablet 1    naproxen 500 MG Oral Tab Take 1 tablet (500 mg total) by mouth 2 (two) times daily with meals. 30 tablet 0    montelukast 10 MG Oral Tab Take 1 tablet (10 mg total) by mouth nightly. TAKE AT BEDTIME 90 tablet 0    Albuterol Sulfate 1.25 MG/3ML Inhalation Nebu Soln 3 mL (1.25 mg total) as needed. albuterol 108 (90 Base) MCG/ACT Inhalation Aero Soln Inhale 2 puffs into the lungs every 4 (four) hours as needed. Cholecalciferol 25 MCG (1000 UT) Oral Tab Take 1 tablet (1,000 Units total) by mouth daily. fluticasone furoate-vilanterol 100-25 MCG/ACT Inhalation Aerosol Powder, Breath Activated Inhale 1 puff into the lungs daily.  (Patient not taking: Reported on 9/2/2023) 1 each 1      Past Medical History:   Diagnosis Date    Allergic rhinitis     Asthma     Essential hypertension     Hyperlipidemia     Prediabetes No past surgical history on file. Family History   Problem Relation Age of Onset    Lipids Father     Hypertension Father     Lipids Mother     Hypertension Mother     Diabetes Brother     Heart Disorder Brother         CAD    Diabetes Maternal Aunt       Social History    Socioeconomic History      Marital status: Single    Tobacco Use      Smoking status: Former        Types: Cigarettes        Passive exposure: Past      Smokeless tobacco: Never      Tobacco comments: social smoking    Vaping Use      Vaping Use: Some days    Substance and Sexual Activity      Alcohol use: Yes        Comment: socially       Drug use: Never          REVIEW OF SYSTEMS:     Denies nause, fever, chills  No calf pain  Denies chest pain or SOB      EXAM:   LMP 08/17/2023 (Exact Date)   GENERAL: well developed, well nourished, in no apparent distress  EXTREMITIES:   1. Integument: Normal skin temperature and turgor  2. Vascular: Dorsalis pedis two out of four bilateral and posterior tibial pulses two out of   four bilateral, capillary refill normal.   3. Musculoskeletal: All muscle groups are graded 5 out of 5 in the foot and ankle. No pain with palpation to posterior medial calcaneal tubercle or posterior calcaneus. Pain with palpation along fourth metatarsal   4. Neurological: Normal sharp dull sensation; reflexes normal.             ASSESSMENT AND PLAN:   Diagnoses and all orders for this visit:    Right foot pain  -     MRI FOOT (W+WO), RIGHT (CPT=73720); Future    Other orders  -     methylPREDNISolone 4 MG Oral Tablet Therapy Pack; Take per package insert (instructions).  Take as directed on the box        Plan:     -Patient seen and examined and findings discussed with patient.  -Discussed etiology of condition along with treatment options with patient  -Reviewed x-rays of right foot with patient and went over radiology of condition  -Prescribed Medrol Dosepak to be taken as directed  -Advised patient to walk in a supportive shoe and to avoid high-impact activities at this time  -Continue physical therapy  -Patient to schedule an MRI for right foot if no improvement to right foot pain for her obtain MRI  -We will reevaluate in 3 weeks          The patient indicates understanding of these issues and agrees to the plan.         Radha Chahal DPM

## 2023-11-10 ENCOUNTER — OFFICE VISIT (OUTPATIENT)
Dept: INTERNAL MEDICINE CLINIC | Facility: CLINIC | Age: 33
End: 2023-11-10

## 2023-11-10 VITALS
TEMPERATURE: 98 F | SYSTOLIC BLOOD PRESSURE: 120 MMHG | OXYGEN SATURATION: 98 % | HEIGHT: 60 IN | WEIGHT: 146.5 LBS | BODY MASS INDEX: 28.76 KG/M2 | DIASTOLIC BLOOD PRESSURE: 66 MMHG | HEART RATE: 105 BPM

## 2023-11-10 DIAGNOSIS — S16.1XXA STRAIN OF NECK MUSCLE, INITIAL ENCOUNTER: Primary | ICD-10-CM

## 2023-11-10 DIAGNOSIS — S39.012A STRAIN OF LUMBAR REGION, INITIAL ENCOUNTER: ICD-10-CM

## 2023-11-10 DIAGNOSIS — Z31.41 FERTILITY TESTING: ICD-10-CM

## 2023-11-10 PROCEDURE — 3074F SYST BP LT 130 MM HG: CPT | Performed by: INTERNAL MEDICINE

## 2023-11-10 PROCEDURE — 3078F DIAST BP <80 MM HG: CPT | Performed by: INTERNAL MEDICINE

## 2023-11-10 PROCEDURE — 3008F BODY MASS INDEX DOCD: CPT | Performed by: INTERNAL MEDICINE

## 2023-11-10 PROCEDURE — 99214 OFFICE O/P EST MOD 30 MIN: CPT | Performed by: INTERNAL MEDICINE

## 2023-11-10 RX ORDER — CYCLOBENZAPRINE HCL 10 MG
10 TABLET ORAL NIGHTLY
Qty: 14 TABLET | Refills: 0 | Status: SHIPPED | OUTPATIENT
Start: 2023-11-10
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
## 2023-11-24 ENCOUNTER — NURSE TRIAGE (OUTPATIENT)
Dept: INTERNAL MEDICINE CLINIC | Facility: CLINIC | Age: 33
End: 2023-11-24

## 2023-11-24 NOTE — TELEPHONE ENCOUNTER
Action Requested: Summary for Provider     []  Critical Lab, Recommendations Needed  [] Need Additional Advice  []   FYI    []   Need Orders  [] Need Medications Sent to Pharmacy  []  Other     SUMMARY: Patient sent to ER. Reason for call: Rapid Heart Beat  Onset:   -125; feels dizzy. For a week. feels neck pain/tightness. Aggravating factors: not able to determine if stress. In the past, she has had these episodes now and then. But now more frequently and constant. Per protocol, ER advised. Patient verbalized understanding. F/u appt given Monday.    Reason for Disposition   Dizziness, lightheadedness, or weakness    Protocols used: Heart Rate and Heartbeat [id number]

## 2023-11-30 ENCOUNTER — HOSPITAL ENCOUNTER (OUTPATIENT)
Dept: GENERAL RADIOLOGY | Age: 33
Discharge: HOME | End: 2023-11-30
Attending: STUDENT IN AN ORGANIZED HEALTH CARE EDUCATION/TRAINING PROGRAM
Payer: COMMERCIAL

## 2023-11-30 ENCOUNTER — HOSPITAL ENCOUNTER (OUTPATIENT)
Dept: MRI IMAGING | Age: 33
Discharge: HOME | End: 2023-11-30
Attending: STUDENT IN AN ORGANIZED HEALTH CARE EDUCATION/TRAINING PROGRAM
Payer: COMMERCIAL

## 2023-11-30 DIAGNOSIS — M79.671 RIGHT FOOT PAIN: ICD-10-CM

## 2023-11-30 PROCEDURE — 73720 MRI LWR EXTREMITY W/O&W/DYE: CPT | Performed by: STUDENT IN AN ORGANIZED HEALTH CARE EDUCATION/TRAINING PROGRAM

## 2023-11-30 PROCEDURE — A9575 INJ GADOTERATE MEGLUMI 0.1ML: HCPCS | Performed by: STUDENT IN AN ORGANIZED HEALTH CARE EDUCATION/TRAINING PROGRAM

## 2023-11-30 RX ORDER — GADOTERATE MEGLUMINE 376.9 MG/ML
15 INJECTION INTRAVENOUS
Status: COMPLETED | OUTPATIENT
Start: 2023-11-30 | End: 2023-11-30

## 2023-11-30 RX ADMIN — GADOTERATE MEGLUMINE 13 ML: 376.9 INJECTION INTRAVENOUS at 10:25:00

## 2023-12-11 ENCOUNTER — TELEPHONE (OUTPATIENT)
Dept: PODIATRY CLINIC | Facility: CLINIC | Age: 33
End: 2023-12-11

## 2023-12-11 DIAGNOSIS — M79.671 RIGHT FOOT PAIN: Primary | ICD-10-CM

## 2023-12-11 RX ORDER — IBUPROFEN 600 MG/1
600 TABLET ORAL EVERY 8 HOURS
Qty: 90 TABLET | Refills: 0 | Status: SHIPPED | OUTPATIENT
Start: 2023-12-11

## 2023-12-11 NOTE — TELEPHONE ENCOUNTER
S/w patient- Patient was seen on 10/27/23 for right foot pain. Patient was given steroid pack at that time which she says was moderately helpful for a few weeks. She completed MRI that we ordered on 11/30/23. States that she still is in a lot of pain and the first appointment that she could schedule was 12/29/23 for MRI results. She states that she is icing, elevating and using ibuprofen with minimal relief. 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 She was wondering if Dr Owen Palacios would send any additional medications for pain and if she could have note to do desk duty until her follow up on 12/29/23.     Please see MRI results and advise on recommendations for patient

## 2023-12-11 NOTE — TELEPHONE ENCOUNTER
Patient calling has questions per pt she's having foot severe pain. Would like to know if Dr can prescribe medication in meantime till her upcoming appt .

## 2023-12-11 NOTE — TELEPHONE ENCOUNTER
S/w patient- Booked her for appointment on 12/15/23 for 8am. Letter created for patient and sent through [employer]. Patient states that she bought a CAM boot and may start wearing it to help with pain. I told her to bring whatever boot she bought to the appointment so that Dr Carlos Foote could examine to make sure that it fits properly.     JUSTINO

## 2023-12-11 NOTE — TELEPHONE ENCOUNTER
Ok to [employer] her for earlier appointment. Give her a letter for desk duty please. We will discuss results when she comes in.  Ill send Ibuprofen 600mg to the pharmacy

## 2023-12-12 ENCOUNTER — OFFICE VISIT (OUTPATIENT)
Dept: OBGYN CLINIC | Facility: CLINIC | Age: 33
End: 2023-12-12

## 2023-12-12 VITALS
HEIGHT: 60 IN | BODY MASS INDEX: 28.66 KG/M2 | WEIGHT: 146 LBS | DIASTOLIC BLOOD PRESSURE: 66 MMHG | SYSTOLIC BLOOD PRESSURE: 120 MMHG

## 2023-12-12 DIAGNOSIS — Z31.69 INFERTILITY COUNSELING: Primary | ICD-10-CM

## 2023-12-12 PROCEDURE — 3078F DIAST BP <80 MM HG: CPT | Performed by: OBSTETRICS & GYNECOLOGY

## 2023-12-12 PROCEDURE — 99243 OFF/OP CNSLTJ NEW/EST LOW 30: CPT | Performed by: OBSTETRICS & GYNECOLOGY

## 2023-12-12 PROCEDURE — 3074F SYST BP LT 130 MM HG: CPT | Performed by: OBSTETRICS & GYNECOLOGY

## 2023-12-12 PROCEDURE — 3008F BODY MASS INDEX DOCD: CPT | Performed by: OBSTETRICS & GYNECOLOGY

## 2023-12-15 ENCOUNTER — OFFICE VISIT (OUTPATIENT)
Dept: PODIATRY CLINIC | Facility: CLINIC | Age: 33
End: 2023-12-15

## 2023-12-15 VITALS — SYSTOLIC BLOOD PRESSURE: 129 MMHG | DIASTOLIC BLOOD PRESSURE: 81 MMHG | HEART RATE: 79 BPM

## 2023-12-15 DIAGNOSIS — M19.271 OTHER SECONDARY OSTEOARTHRITIS OF RIGHT FOOT: Primary | ICD-10-CM

## 2023-12-15 PROCEDURE — 3079F DIAST BP 80-89 MM HG: CPT | Performed by: STUDENT IN AN ORGANIZED HEALTH CARE EDUCATION/TRAINING PROGRAM

## 2023-12-15 PROCEDURE — 20550 NJX 1 TENDON SHEATH/LIGAMENT: CPT | Performed by: STUDENT IN AN ORGANIZED HEALTH CARE EDUCATION/TRAINING PROGRAM

## 2023-12-15 PROCEDURE — 3074F SYST BP LT 130 MM HG: CPT | Performed by: STUDENT IN AN ORGANIZED HEALTH CARE EDUCATION/TRAINING PROGRAM

## 2023-12-15 RX ORDER — TRIAMCINOLONE ACETONIDE 40 MG/ML
40 INJECTION, SUSPENSION INTRA-ARTICULAR; INTRAMUSCULAR ONCE
Status: COMPLETED | OUTPATIENT
Start: 2023-12-15 | End: 2023-12-15

## 2023-12-15 RX ORDER — DEXAMETHASONE SODIUM PHOSPHATE 4 MG/ML
4 VIAL (ML) INJECTION ONCE
Status: COMPLETED | OUTPATIENT
Start: 2023-12-15 | End: 2023-12-15

## 2023-12-15 RX ADMIN — TRIAMCINOLONE ACETONIDE 40 MG: 40 INJECTION, SUSPENSION INTRA-ARTICULAR; INTRAMUSCULAR at 09:19:00

## 2023-12-15 RX ADMIN — DEXAMETHASONE SODIUM PHOSPHATE 4 MG: 4 MG/ML VIAL (ML) INJECTION at 09:18:00

## 2023-12-15 NOTE — PROGRESS NOTES
Per verbal order from Dr Carlos Foote, draw up 1 ml Dexamethasone Sodium, 1 ml Lidocaine 1% and 1 ml Kenalog 40 mg for this pt Right foot. Non-Graft Cartilage Fenestration Text: The cartilage was fenestrated with a 2mm punch biopsy to help facilitate healing.

## 2023-12-15 NOTE — PROGRESS NOTES
[facility], [facility] Podiatry  Progress Note      Addi Semaj is a 29year old female. Chief Complaint   Patient presents with    Foot Pain     R foot f/u- rates pain 5/10 most of the time- pt is pre-diabetic- here for MRI result             HPI:     Patient is a pleasant 29-year-old female presents to clinic today for follow-up of right foot MRI results. She admits to mild relief of pain symptoms with cam boot and work restrictions. Still admits to some tenderness with pressure and ambulation. Allergies: Amoxicillin, Cat hair extract, Latex, Shrimp flavor, and Seafood    Current Outpatient Medications   Medication Sig Dispense Refill    ibuprofen 600 MG Oral Tab Take 1 tablet (600 mg total) by mouth every 8 (eight) hours. Take with food 90 tablet 0    cyclobenzaprine 10 MG Oral Tab Take 1 tablet (10 mg total) by mouth nightly. (Patient not taking: Reported on 12/12/2023) 14 tablet 0    methylPREDNISolone 4 MG Oral Tablet Therapy Pack Take per package insert (instructions). Take as directed on the box (Patient not taking: Reported on 12/12/2023) 21 tablet 0    NIFEdipine ER 30 MG Oral Tablet 24 Hr Take 1 tablet (30 mg total) by mouth daily. 90 tablet 1    naproxen 500 MG Oral Tab Take 1 tablet (500 mg total) by mouth 2 (two) times daily with meals. 30 tablet 0    fluticasone furoate-vilanterol 100-25 MCG/ACT Inhalation Aerosol Powder, Breath Activated Inhale 1 puff into the lungs daily. (Patient not taking: Reported on 9/2/2023) 1 each 1    montelukast 10 MG Oral Tab Take 1 tablet (10 mg total) by mouth nightly. TAKE AT BEDTIME 90 tablet 0    Albuterol Sulfate 1.25 MG/3ML Inhalation Nebu Soln 3 mL (1.25 mg total) as needed. albuterol 108 (90 Base) MCG/ACT Inhalation Aero Soln Inhale 2 puffs into the lungs every 4 (four) hours as needed. Cholecalciferol 25 MCG (1000 UT) Oral Tab Take 1 tablet (1,000 Units total) by mouth daily.         Past Medical History:   Diagnosis Date    Allergic rhinitis     Asthma     Essential hypertension     Hyperlipidemia     Prediabetes       History reviewed. No pertinent surgical history. Family History   Problem Relation Age of Onset    Lipids Father     Hypertension Father     Lipids Mother     Hypertension Mother     Diabetes Brother     Heart Disorder Brother         CAD    Diabetes Maternal Aunt       Social History     Socioeconomic History    Marital status: Single   Tobacco Use    Smoking status: Former     Types: Cigarettes     Passive exposure: Past    Smokeless tobacco: Never    Tobacco comments:     social smoking   Vaping Use    Vaping Use: Some days   Substance and Sexual Activity    Alcohol use: Yes     Comment: socially     Drug use: Never           REVIEW OF SYSTEMS:     Denies nause, fever, chills  No calf pain  Denies chest pain or SOB      EXAM:   /81   Pulse 79   LMP 10/07/2023 (Exact Date)   GENERAL: well developed, well nourished, in no apparent distress  EXTREMITIES:   1. Integument: Normal skin temperature and turgor  2. Vascular: Dorsalis pedis two out of four bilateral and posterior tibial pulses two out of   four bilateral, capillary refill normal.   3. Musculoskeletal: All muscle groups are graded 5 out of 5 in the foot and ankle. Tenderness to palpation to plantar medial aspect of left foot   4. Neurological: Normal sharp dull sensation; reflexes normal.             ASSESSMENT AND PLAN:   Diagnoses and all orders for this visit:    Other secondary osteoarthritis of right foot  -     triamcinolone acetonide (Kenalog-40) 40 MG/ML injection 40 mg  -     dexamethasone (Decadron) 4 MG/ML injection 4 mg        Plan:     Patient seen and examined and findings discussed with patient. MRI results reviewed with patient. Do not recommend surgical intervention at this time for removal of soft tissue cyst as its not pt main pain symtom. Discussed cortisone injections. Patient agrees to cortisone injection today.   Right plantar foot aspect cleansed with alcohol. A cortisone injection mixture of 1 cc 1% lidocaine plain, 1 cc of Kenalog 40 and 1 cc of dexamethasone administered on the plantar aspect of the right foot without complications. Patient tolerated injection well. Continue work restrictions for 1 more month. Follow-up in 1 month. The patient indicates understanding of these issues and agrees to the plan.         Jose Early DPM

## 2024-01-18 ENCOUNTER — TELEPHONE (OUTPATIENT)
Dept: OBGYN CLINIC | Facility: CLINIC | Age: 34
End: 2024-01-18

## 2024-01-18 DIAGNOSIS — N64.4 MASTODYNIA: Primary | ICD-10-CM

## 2024-02-23 DIAGNOSIS — J45.21 MILD INTERMITTENT ASTHMA WITH EXACERBATION (HCC): ICD-10-CM

## 2024-02-23 NOTE — TELEPHONE ENCOUNTER
Pharmacy requesting refill      montelukast 10 MG Oral Tab, Take 1 tablet (10 mg total) by mouth nightly. TAKE AT BEDTIME, Disp: 90 tablet, Rfl: 0

## 2024-02-24 RX ORDER — MONTELUKAST SODIUM 10 MG/1
10 TABLET ORAL NIGHTLY
Qty: 90 TABLET | Refills: 1 | Status: SHIPPED | OUTPATIENT
Start: 2024-02-24

## 2024-02-24 NOTE — TELEPHONE ENCOUNTER
Please review.  Protocol failed / No protocol.   Requested Prescriptions   Pending Prescriptions Disp Refills    montelukast 10 MG Oral Tab 90 tablet 3     Sig: Take 1 tablet (10 mg total) by mouth nightly. TAKE AT BEDTIME       Asthma & COPD Medication Protocol Failed - 2/23/2024 11:19 AM        Failed - Asthma Action Score greater than or equal to 20        Failed - AAP/ACT given in last 12 months     No data recorded  No data recorded  No data recorded  No data recorded          Passed - Appointment in past 6 or next 3 months      Recent Outpatient Visits              2 months ago Other secondary osteoarthritis of right foot    [facility] Rosy Mcgraw DPM    Office Visit    2 months ago Infertility counseling    [facility] - OB/GYN Uday Eid MD    Office Visit    3 months ago [facility]    [facility]    4 months ago Right foot pain    [facility] Rosy Mcgraw DPM    Office Visit    5 months ago [facility] foot pain    [facility]          Future Appointments         Provider Department Appt Notes    In 4 days [address] M[facility]ography [facility]     In 1 week Uday Eid MD [facility] - OB/GYN Annual pap smear    In 1 week Sarbjit De La Rosa MD [facility] Annual, labs, A1C 1/20/23                   Recent Outpatient Visits              2 months ago Other secondary osteoarthritis of right foot    [facility] Rosy Mcgraw DPM    Office Visit    2 months ago Infertility counseling    [facility]  [facility], [facility] - OB/GYN Uday Eid MD    Office Visit    3 months ago Strain of neck muscle, initial encounter    [facility] [place] Sarbjit De La Rosa MD    Office Visit    4 months ago Right foot pain    [facility] [place] Rosy Grayson DPM    Office Visit    5 months ago [facility] foot [facility]    [facility]           Future Appointments         Provider Department Appt Notes    In 4 days [facility] Mammography - [place] [facility]     In 1 week Uday Eid MD [facility] - OB/GYN Annual pap smear    In 1 week Sarbjit De La Rosa MD [facility] Annual, labs, A1C 1/20/23

## 2024-03-06 ENCOUNTER — TELEPHONE (OUTPATIENT)
Dept: INTERNAL MEDICINE CLINIC | Facility: CLINIC | Age: 34
End: 2024-03-06

## 2024-03-06 NOTE — TELEPHONE ENCOUNTER
Pt needs a hospital followup appt w/ Dr De La Rosa only. Declines to see anyone else. She was released on 3-5-24 from [facility] in [place], diagnosis gastroenteritis and acute pancreatitis.    Call pt at: [phone]   Ok to leave a voice mail.    She is supposed to be seen within one week.

## 2024-03-07 ENCOUNTER — OFFICE VISIT (OUTPATIENT)
Dept: INTERNAL MEDICINE CLINIC | Facility: CLINIC | Age: 34
End: 2024-03-07

## 2024-03-07 VITALS
SYSTOLIC BLOOD PRESSURE: 136 MMHG | DIASTOLIC BLOOD PRESSURE: 89 MMHG | WEIGHT: 138.88 LBS | BODY MASS INDEX: 27.26 KG/M2 | TEMPERATURE: 98 F | OXYGEN SATURATION: 99 % | HEART RATE: 71 BPM | HEIGHT: 60 IN

## 2024-03-07 DIAGNOSIS — J45.21 MILD INTERMITTENT ASTHMA WITH EXACERBATION (HCC): ICD-10-CM

## 2024-03-07 DIAGNOSIS — K52.9 ACUTE GASTROENTERITIS: ICD-10-CM

## 2024-03-07 DIAGNOSIS — B97.89: Primary | ICD-10-CM

## 2024-03-07 DIAGNOSIS — I10 PRIMARY HYPERTENSION: ICD-10-CM

## 2024-03-07 DIAGNOSIS — K85.80: Primary | ICD-10-CM

## 2024-03-07 PROCEDURE — 3079F DIAST BP 80-89 MM HG: CPT | Performed by: INTERNAL MEDICINE

## 2024-03-07 PROCEDURE — 3075F SYST BP GE 130 - 139MM HG: CPT | Performed by: INTERNAL MEDICINE

## 2024-03-07 PROCEDURE — 3008F BODY MASS INDEX DOCD: CPT | Performed by: INTERNAL MEDICINE

## 2024-03-07 PROCEDURE — 99214 OFFICE O/P EST MOD 30 MIN: CPT | Performed by: INTERNAL MEDICINE

## 2024-03-07 RX ORDER — FLUTICASONE FUROATE AND VILANTEROL 100; 25 UG/1; UG/1
1 POWDER RESPIRATORY (INHALATION) DAILY
Qty: 1 EACH | Refills: 1 | Status: SHIPPED | OUTPATIENT
Start: 2024-03-07

## 2024-03-07 RX ORDER — CIPROFLOXACIN 500 MG/1
TABLET, FILM COATED ORAL
COMMUNITY
Start: 2024-03-05

## 2024-03-07 RX ORDER — ALBUTEROL SULFATE 90 UG/1
2 AEROSOL, METERED RESPIRATORY (INHALATION) EVERY 4 HOURS PRN
Qty: 1 EACH | Refills: 2 | Status: SHIPPED | OUTPATIENT
Start: 2024-03-07

## 2024-03-07 RX ORDER — ONDANSETRON 4 MG/1
4 TABLET, FILM COATED ORAL EVERY 8 HOURS PRN
Qty: 30 TABLET | Refills: 0 | Status: SHIPPED | OUTPATIENT
Start: 2024-03-07

## 2024-03-07 NOTE — PROGRESS NOTES
Subjective:     Patient ID: Deborah Serrano is a 33 year old female.    Patient presents today for post hospital ffup. She was admitted at [facility] with vomiting, diarrhea, abd pain and diagnosed with acute gastroenteritis and acute viral pancreatitis.  She was treated with pain meds, IVF and wa slaso given abx empiricallty  for her gastroenteritis since she just came back from vacation from [place] when symptoms started.   Symptoms had improved and diet slowly advanced per pt and by the time of discharge, able to tolerate soft diet.         History/Other:   Review of Systems   Constitutional: Negative.    HENT: Negative.     Eyes: Negative.    Respiratory: Negative.     Cardiovascular: Negative.    Gastrointestinal:  Positive for nausea. Negative for abdominal pain, anal bleeding, blood in stool, constipation, diarrhea and vomiting.   Genitourinary: Negative.      Current Outpatient Medications   Medication Sig Dispense Refill    ciprofloxacin 500 MG Oral Tab       montelukast 10 MG Oral Tab Take 1 tablet (10 mg total) by mouth nightly. TAKE AT BEDTIME 90 tablet 1    ibuprofen 600 MG Oral Tab Take 1 tablet (600 mg total) by mouth every 8 (eight) hours. Take with food 90 tablet 0    Albuterol Sulfate 1.25 MG/3ML Inhalation Nebu Soln 3 mL (1.25 mg total) as needed.      albuterol 108 (90 Base) MCG/ACT Inhalation Aero Soln Inhale 2 puffs into the lungs every 4 (four) hours as needed.      Cholecalciferol 25 MCG (1000 UT) Oral Tab Take 1 tablet (1,000 Units total) by mouth daily.      cyclobenzaprine 10 MG Oral Tab Take 1 tablet (10 mg total) by mouth nightly. (Patient not taking: Reported on 12/12/2023) 14 tablet 0    methylPREDNISolone 4 MG Oral Tablet Therapy Pack Take per package insert (instructions). Take as directed on the box (Patient not taking: Reported on 12/12/2023) 21 tablet 0    NIFEdipine ER 30 MG Oral Tablet 24 Hr Take 1 tablet (30 mg total) by mouth daily. (Patient not taking: Reported on 3/7/2024)  90 tablet 1    naproxen 500 MG Oral Tab Take 1 tablet (500 mg total) by mouth 2 (two) times daily with meals. (Patient not taking: Reported on 3/7/2024) 30 tablet 0    fluticasone furoate-vilanterol 100-25 MCG/ACT Inhalation Aerosol Powder, Breath Activated Inhale 1 puff into the lungs daily. (Patient not taking: Reported on 9/2/2023) 1 each 1     Allergies:  Allergies   Allergen Reactions    Amoxicillin HIVES, ITCHING, RASH, SWELLING and WHEEZING    Cat Hair Extract Coughing, ITCHING, RASH, SHORTNESS OF BREATH, SWELLING and WHEEZING    Latex ITCHING, RASH and SWELLING    Shrimp Flavor SWELLING     Some seafood     Seafood ITCHING and RASH       Past Medical History:   Diagnosis Date    Allergic rhinitis     Asthma (HCC)     Essential hypertension     Hyperlipidemia     Prediabetes       History reviewed. No pertinent surgical history.   Family History   Problem Relation Age of Onset    Lipids Father     Hypertension Father     Lipids Mother     Hypertension Mother     Diabetes Brother     Heart Disorder Brother         CAD    Diabetes Maternal Aunt       Social History:   Social History     Socioeconomic History    Marital status: Single   Tobacco Use    Smoking status: Former     Types: Cigarettes     Passive exposure: Past    Smokeless tobacco: Never    Tobacco comments:     social smoking   Vaping Use    Vaping Use: Some days   Substance and Sexual Activity    Alcohol use: Yes     Comment: socially     Drug use: Never        Objective:   Physical Exam  Constitutional:       General: She is not in acute distress.     Appearance: She is not ill-appearing, toxic-appearing or diaphoretic.   HENT:      Mouth/Throat:      Pharynx: No oropharyngeal exudate or posterior oropharyngeal erythema.   Eyes:      General: No scleral icterus.        Right eye: No discharge.         Left eye: No discharge.      Conjunctiva/sclera: Conjunctivae normal.   Neck:      Vascular: No carotid bruit.   Cardiovascular:      Rate and  Rhythm: Normal rate and regular rhythm.      Heart sounds: Normal heart sounds. No murmur heard.     No gallop.   Pulmonary:      Effort: Pulmonary effort is normal. No respiratory distress.      Breath sounds: Normal breath sounds. No wheezing or rales.   Abdominal:      General: Bowel sounds are normal. There is no distension.      Palpations: Abdomen is soft. There is no mass.      Tenderness: There is no abdominal tenderness. There is no right CVA tenderness or guarding.   Musculoskeletal:      Cervical back: Normal range of motion and neck supple. No rigidity or tenderness.      Right lower leg: No edema.      Left lower leg: No edema.   Lymphadenopathy:      Cervical: No cervical adenopathy.   Neurological:      Mental Status: She is alert.         Assessment & Plan:   (K85.80,  B97.89) Acute viral pancreatitis (HCC)  (primary encounter diagnosis)  Plan: pt  abd pain already improving, still some nausea and she still on soft diet. Pt given zofran. She was seen by GI specialist while in [facility] and told viral cause of pancreatitis and treated symptomatically. She wants to go back to work tomorrow on light duty for at least a week .ltter give .    (K52.9) Acute gastroenteritis  Plan: pt was treated empirically with cipro from the hospital which she continues to take. She will ffup her stool culture from [facility] where she was admitted and let me know .    (I10) Primary hypertension  Plan: bp controlled with bp med. Cpm .    (J45.21) Mild intermittent asthma with exacerbation (HCC)  Plan: fluticasone furoate-vilanterol 100-25 MCG/ACT         Inhalation Aerosol Powder, Breath Activated        Refilled inhaler.        No orders of the defined types were placed in this encounter.      Meds This Visit:  Requested Prescriptions      No prescriptions requested or ordered in this encounter       Imaging & Referrals:  None

## 2024-03-07 NOTE — TELEPHONE ENCOUNTER
Please review.  Protocol failed / Has no protocol.    Medication is listed as patient reported.  Future Appointments   Date Time Provider Department Center   3/7/2024  4:00 PM Sarbjit De La Rosa MD [facility]        Requested Prescriptions   Pending Prescriptions Disp Refills    Albuterol Sulfate 1.25 MG/3ML Inhalation Nebu Soln  0     Sig: 3 mL (1.25 mg total) as needed.       Asthma & COPD Medication Protocol Failed - 3/5/2024  6:41 PM        Failed - Asthma Action Score greater than or equal to 20        Failed - AAP/ACT given in last 12 months     No data recorded  No data recorded  No data recorded  No data recorded          Passed - Appointment in past 6 or next 3 months      Recent Outpatient Visits              2 months ago [facility] of [facility]    [facility]    2 months ago Infertility counseling    [facility] Uday Eid MD    Office Visit    3 months ago Strain of neck muscle, [facility]    [facility] Visit    4 months ago [facility] foot pain    [facility]    Office Visit    6 months ago Right foot pain    [facility] Sarbjit De La Rosa MD    Office Visit          Future Appointments         Provider Department Appt Notes    Today Sarbjit De La Rosa MD [facility] hosp f/u ok per Dr. CHI    In 1 month Uday Eid MD [facility] Annual pap smear    In 1 month Sarbjit De La Rosa MD [facility] Annual, labs, A1C 1/20/23 ; sees gyne

## 2024-03-08 RX ORDER — ALBUTEROL SULFATE 1.25 MG/3ML
1 SOLUTION RESPIRATORY (INHALATION) EVERY 6 HOURS PRN
Qty: 120 EACH | Refills: 2 | Status: SHIPPED | OUTPATIENT
Start: 2024-03-08

## 2024-03-27 ENCOUNTER — MED REC SCAN ONLY (OUTPATIENT)
Dept: INTERNAL MEDICINE CLINIC | Facility: CLINIC | Age: 34
End: 2024-03-27

## 2024-04-05 ENCOUNTER — OFFICE VISIT (OUTPATIENT)
Dept: PODIATRY CLINIC | Facility: CLINIC | Age: 34
End: 2024-04-05

## 2024-04-05 VITALS — HEART RATE: 76 BPM | DIASTOLIC BLOOD PRESSURE: 89 MMHG | SYSTOLIC BLOOD PRESSURE: 134 MMHG

## 2024-04-05 DIAGNOSIS — M79.671 RIGHT FOOT PAIN: Primary | ICD-10-CM

## 2024-04-05 PROCEDURE — 3075F SYST BP GE 130 - 139MM HG: CPT | Performed by: STUDENT IN AN ORGANIZED HEALTH CARE EDUCATION/TRAINING PROGRAM

## 2024-04-05 PROCEDURE — 99213 OFFICE O/P EST LOW 20 MIN: CPT | Performed by: STUDENT IN AN ORGANIZED HEALTH CARE EDUCATION/TRAINING PROGRAM

## 2024-04-05 PROCEDURE — 3079F DIAST BP 80-89 MM HG: CPT | Performed by: STUDENT IN AN ORGANIZED HEALTH CARE EDUCATION/TRAINING PROGRAM

## 2024-04-05 NOTE — PROGRESS NOTES
[facility] Podiatry  Progress Note      Deborah Serrnao is a 33 year old female.   Chief Complaint   Patient presents with    Foot Pain     R foot f/u- had injection on 12/15/2023 that helped for 2 mos- rates pain 3-9/10 on and off             HPI:     Patient presents to clinic for follow-up of right heel pain which she admits that she is not experiencing pain on the heel since the last cortisone injection however she does feel some discomfort on her plantar forefoot.  Admits that the pain is mild rating it a 3 out of 10.  She does use supportive shoes even at home.    Allergies: Amoxicillin, Cat hair extract, Latex, Shrimp flavor, and Seafood    Current Outpatient Medications   Medication Sig Dispense Refill    Albuterol Sulfate 1.25 MG/3ML Inhalation Nebu Soln Take 3 mL (1.25 mg total) by nebulization every 6 (six) hours as needed (12). 120 each 2    ciprofloxacin 500 MG Oral Tab       ondansetron (ZOFRAN) 4 mg tablet Take 1 tablet (4 mg total) by mouth every 8 (eight) hours as needed for Nausea. 30 tablet 0    albuterol 108 (90 Base) MCG/ACT Inhalation Aero Soln Inhale 2 puffs into the lungs every 4 (four) hours as needed. 1 each 2    fluticasone furoate-vilanterol 100-25 MCG/ACT Inhalation Aerosol Powder, Breath Activated Inhale 1 puff into the lungs daily. 1 each 1    montelukast 10 MG Oral Tab Take 1 tablet (10 mg total) by mouth nightly. TAKE AT BEDTIME 90 tablet 1    ibuprofen 600 MG Oral Tab Take 1 tablet (600 mg total) by mouth every 8 (eight) hours. Take with food 90 tablet 0    cyclobenzaprine 10 MG Oral Tab Take 1 tablet (10 mg total) by mouth nightly. (Patient not taking: Reported on 12/12/2023) 14 tablet 0    methylPREDNISolone 4 MG Oral Tablet Therapy Pack Take per package insert (instructions). Take as directed on the box (Patient not taking: Reported on 12/12/2023) 21 tablet 0    NIFEdipine ER 30 MG Oral Tablet 24 Hr Take 1 tablet (30 mg total) by mouth daily. (Patient not taking:  Reported on 3/7/2024) 90 tablet 1    naproxen 500 MG Oral Tab Take 1 tablet (500 mg total) by mouth 2 (two) times daily with meals. (Patient not taking: Reported on 3/7/2024) 30 tablet 0    Cholecalciferol 25 MCG (1000 UT) Oral Tab Take 1 tablet (1,000 Units total) by mouth daily.        Past Medical History:   Diagnosis Date    Allergic rhinitis     Asthma (HCC)     Essential hypertension     Hyperlipidemia     Prediabetes       History reviewed. No pertinent surgical history.   Family History   Problem Relation Age of Onset    Lipids Father     Hypertension Father     Lipids Mother     Hypertension Mother     Diabetes Brother     Heart Disorder Brother         CAD    Diabetes Maternal Aunt       Social History     Socioeconomic History    Marital status: Single   Tobacco Use    Smoking status: Former     Types: Cigarettes     Passive exposure: Past    Smokeless tobacco: Never    Tobacco comments:     social smoking   Vaping Use    Vaping Use: Some days   Substance and Sexual Activity    Alcohol use: Yes     Comment: socially     Drug use: Never           REVIEW OF SYSTEMS:     Denies nause, fever, chills  No calf pain  Denies chest pain or SOB      EXAM:   /89   Pulse 76   LMP 02/21/2024 (Exact Date)   GENERAL: well developed, well nourished, in no apparent distress  EXTREMITIES:   1. Integument: Normal skin temperature and turgor  2. Vascular: Dorsalis pedis two out of four bilateral and posterior tibial pulses two out of   four bilateral, capillary refill normal.   3. Musculoskeletal: All muscle groups are graded 5 out of 5 in the foot and ankle.  Mild tenderness to the plantar aspect of the right forefoot.  No pain with palpation to right medial calcaneal tubercle   4. Neurological: Normal sharp dull sensation; reflexes normal.             ASSESSMENT AND PLAN:   Diagnoses and all orders for this visit:    Right foot pain        Plan:     Patient seen and examined and findings discussed with patient.   Advised patient that I do not recommend a cortisone injection to her heel since she has no symptoms.  Advised her to use supportive shoes and take breaks even when at work to perform calf stretching.  Advised her to use a frozen water bottle while seated to roll the plantar aspect of her right foot.  Return to clinic if symptoms worsen or fail to improve    The patient indicates understanding of these issues and agrees to the plan.        Rosy Grayson DPM

## 2024-04-12 ENCOUNTER — LAB ENCOUNTER (OUTPATIENT)
Dept: LAB | Age: 34
End: 2024-04-12
Attending: INTERNAL MEDICINE
Payer: COMMERCIAL

## 2024-04-12 ENCOUNTER — OFFICE VISIT (OUTPATIENT)
Dept: INTERNAL MEDICINE CLINIC | Facility: CLINIC | Age: 34
End: 2024-04-12

## 2024-04-12 VITALS
OXYGEN SATURATION: 98 % | SYSTOLIC BLOOD PRESSURE: 124 MMHG | HEART RATE: 77 BPM | TEMPERATURE: 98 F | WEIGHT: 140.31 LBS | DIASTOLIC BLOOD PRESSURE: 80 MMHG | HEIGHT: 60 IN | BODY MASS INDEX: 27.55 KG/M2

## 2024-04-12 DIAGNOSIS — Z00.00 ANNUAL PHYSICAL EXAM: ICD-10-CM

## 2024-04-12 DIAGNOSIS — J45.20 MILD INTERMITTENT ASTHMA WITHOUT COMPLICATION (HCC): ICD-10-CM

## 2024-04-12 DIAGNOSIS — R00.2 PALPITATION: ICD-10-CM

## 2024-04-12 DIAGNOSIS — D50.9 MICROCYTIC ANEMIA: ICD-10-CM

## 2024-04-12 DIAGNOSIS — Z00.00 ANNUAL PHYSICAL EXAM: Primary | ICD-10-CM

## 2024-04-12 DIAGNOSIS — I10 PRIMARY HYPERTENSION: ICD-10-CM

## 2024-04-12 LAB
ALBUMIN SERPL-MCNC: 4.6 G/DL (ref 3.2–4.8)
ALBUMIN/GLOB SERPL: 1.4 {RATIO} (ref 1–2)
ALP LIVER SERPL-CCNC: 56 U/L
ALT SERPL-CCNC: 19 U/L
ANION GAP SERPL CALC-SCNC: 5 MMOL/L (ref 0–18)
AST SERPL-CCNC: 18 U/L (ref ?–34)
BASOPHILS # BLD AUTO: 0.05 X10(3) UL (ref 0–0.2)
BASOPHILS NFR BLD AUTO: 0.9 %
BILIRUB SERPL-MCNC: 0.4 MG/DL (ref 0.3–1.2)
BUN BLD-MCNC: 16 MG/DL (ref 9–23)
BUN/CREAT SERPL: 19.3 (ref 10–20)
CALCIUM BLD-MCNC: 10 MG/DL (ref 8.7–10.4)
CHLORIDE SERPL-SCNC: 106 MMOL/L (ref 98–112)
CHOLEST SERPL-MCNC: 194 MG/DL (ref ?–200)
CO2 SERPL-SCNC: 28 MMOL/L (ref 21–32)
CREAT BLD-MCNC: 0.83 MG/DL
DEPRECATED RDW RBC AUTO: 50.7 FL (ref 35.1–46.3)
EGFRCR SERPLBLD CKD-EPI 2021: 96 ML/MIN/1.73M2 (ref 60–?)
EOSINOPHIL # BLD AUTO: 0.17 X10(3) UL (ref 0–0.7)
EOSINOPHIL NFR BLD AUTO: 3 %
ERYTHROCYTE [DISTWIDTH] IN BLOOD BY AUTOMATED COUNT: 20.1 % (ref 11–15)
EST. AVERAGE GLUCOSE BLD GHB EST-MCNC: 103 MG/DL (ref 68–126)
FASTING PATIENT LIPID ANSWER: YES
FASTING STATUS PATIENT QL REPORTED: YES
GLOBULIN PLAS-MCNC: 3.2 G/DL (ref 2.8–4.4)
GLUCOSE BLD-MCNC: 90 MG/DL (ref 70–99)
HBA1C MFR BLD: 5.2 % (ref ?–5.7)
HCT VFR BLD AUTO: 37.7 %
HDLC SERPL-MCNC: 46 MG/DL (ref 40–59)
HGB BLD-MCNC: 11.6 G/DL
IMM GRANULOCYTES # BLD AUTO: 0.01 X10(3) UL (ref 0–1)
IMM GRANULOCYTES NFR BLD: 0.2 %
LDLC SERPL CALC-MCNC: 128 MG/DL (ref ?–100)
LYMPHOCYTES # BLD AUTO: 1.98 X10(3) UL (ref 1–4)
LYMPHOCYTES NFR BLD AUTO: 34.5 %
MCH RBC QN AUTO: 22.3 PG (ref 26–34)
MCHC RBC AUTO-ENTMCNC: 30.8 G/DL (ref 31–37)
MCV RBC AUTO: 72.5 FL
MONOCYTES # BLD AUTO: 0.36 X10(3) UL (ref 0.1–1)
MONOCYTES NFR BLD AUTO: 6.3 %
NEUTROPHILS # BLD AUTO: 3.17 X10 (3) UL (ref 1.5–7.7)
NEUTROPHILS # BLD AUTO: 3.17 X10(3) UL (ref 1.5–7.7)
NEUTROPHILS NFR BLD AUTO: 55.1 %
NONHDLC SERPL-MCNC: 148 MG/DL (ref ?–130)
OSMOLALITY SERPL CALC.SUM OF ELEC: 289 MOSM/KG (ref 275–295)
PLATELET # BLD AUTO: 385 10(3)UL (ref 150–450)
POTASSIUM SERPL-SCNC: 4.5 MMOL/L (ref 3.5–5.1)
PROT SERPL-MCNC: 7.8 G/DL (ref 5.7–8.2)
RBC # BLD AUTO: 5.2 X10(6)UL
SODIUM SERPL-SCNC: 139 MMOL/L (ref 136–145)
TRIGL SERPL-MCNC: 113 MG/DL (ref 30–149)
TSI SER-ACNC: 1.08 MIU/ML (ref 0.55–4.78)
VLDLC SERPL CALC-MCNC: 20 MG/DL (ref 0–30)
WBC # BLD AUTO: 5.7 X10(3) UL (ref 4–11)

## 2024-04-12 PROCEDURE — 83540 ASSAY OF IRON: CPT

## 2024-04-12 PROCEDURE — 85025 COMPLETE CBC W/AUTO DIFF WBC: CPT

## 2024-04-12 PROCEDURE — 80053 COMPREHEN METABOLIC PANEL: CPT

## 2024-04-12 PROCEDURE — 82728 ASSAY OF FERRITIN: CPT

## 2024-04-12 PROCEDURE — 84443 ASSAY THYROID STIM HORMONE: CPT

## 2024-04-12 PROCEDURE — 84466 ASSAY OF TRANSFERRIN: CPT

## 2024-04-12 PROCEDURE — 83020 HEMOGLOBIN ELECTROPHORESIS: CPT

## 2024-04-12 PROCEDURE — 80061 LIPID PANEL: CPT

## 2024-04-12 PROCEDURE — 36415 COLL VENOUS BLD VENIPUNCTURE: CPT

## 2024-04-12 PROCEDURE — 83036 HEMOGLOBIN GLYCOSYLATED A1C: CPT

## 2024-04-12 PROCEDURE — 83021 HEMOGLOBIN CHROMOTOGRAPHY: CPT

## 2024-04-12 RX ORDER — OMEPRAZOLE 40 MG/1
CAPSULE, DELAYED RELEASE ORAL
COMMUNITY
Start: 2024-03-30

## 2024-04-12 NOTE — PROGRESS NOTES
Subjective:     Patient ID: Deborah Serrano is a 33 year old female.    Patient presents today for her annual physical .        History/Other:   Review of Systems   Constitutional: Negative.    HENT: Negative.     Eyes: Negative.    Respiratory: Negative.     Cardiovascular:  Positive for palpitations. Negative for chest pain and leg swelling.         No pnd/orthopnea   Gastrointestinal: Negative.    Genitourinary: Negative.    Allergic/Immunologic: Positive for environmental allergies. Negative for immunocompromised state.   Neurological:  Negative for syncope.   Hematological: Negative.      Current Outpatient Medications   Medication Sig Dispense Refill    Omeprazole 40 MG Oral Capsule Delayed Release       Albuterol Sulfate 1.25 MG/3ML Inhalation Nebu Soln Take 3 mL (1.25 mg total) by nebulization every 6 (six) hours as needed (12). 120 each 2    albuterol 108 (90 Base) MCG/ACT Inhalation Aero Soln Inhale 2 puffs into the lungs every 4 (four) hours as needed. 1 each 2    fluticasone furoate-vilanterol 100-25 MCG/ACT Inhalation Aerosol Powder, Breath Activated Inhale 1 puff into the lungs daily. 1 each 1    montelukast 10 MG Oral Tab Take 1 tablet (10 mg total) by mouth nightly. TAKE AT BEDTIME 90 tablet 1    ibuprofen 600 MG Oral Tab Take 1 tablet (600 mg total) by mouth every 8 (eight) hours. Take with food 90 tablet 0    cyclobenzaprine 10 MG Oral Tab Take 1 tablet (10 mg total) by mouth nightly. 14 tablet 0    Cholecalciferol 25 MCG (1000 UT) Oral Tab Take 1 tablet (1,000 Units total) by mouth daily.      NIFEdipine ER 30 MG Oral Tablet 24 Hr Take 1 tablet (30 mg total) by mouth daily. (Patient not taking: Reported on 3/7/2024) 90 tablet 1    naproxen 500 MG Oral Tab Take 1 tablet (500 mg total) by mouth 2 (two) times daily with meals. (Patient not taking: Reported on 3/7/2024) 30 tablet 0     Allergies:  Allergies   Allergen Reactions    Amoxicillin HIVES, ITCHING, RASH, SWELLING and WHEEZING    Cat  Hair Extract Coughing, ITCHING, RASH, SHORTNESS OF BREATH, SWELLING and WHEEZING    Latex ITCHING, RASH and SWELLING    Shrimp Flavor SWELLING     Some seafood     Seafood ITCHING and RASH       Past Medical History:    Allergic rhinitis    Asthma (HCC)    Essential hypertension    Hyperlipidemia    Prediabetes      History reviewed. No pertinent surgical history.   Family History   Problem Relation Age of Onset    Lipids Father     Hypertension Father     Diabetes Mother     Lipids Mother     Hypertension Mother     Diabetes Brother     Heart Disorder Brother         CAD at 42 /yo    Diabetes Maternal Aunt       Social History:   Social History     Socioeconomic History    Marital status: Single   Tobacco Use    Smoking status: Former     Types: Cigarettes     Passive exposure: Past    Smokeless tobacco: Never    Tobacco comments:     social smoking   Vaping Use    Vaping status: Some Days   Substance and Sexual Activity    Alcohol use: Yes     Comment: socially     Drug use: Never        Objective:   Physical Exam  Constitutional:       General: She is not in acute distress.     Appearance: She is well-developed. She is diaphoretic. She is not ill-appearing or toxic-appearing.   HENT:      Head: Normocephalic and atraumatic.      Right Ear: External ear normal.      Left Ear: External ear normal.      Nose: Nose normal.      Mouth/Throat:      Pharynx: No oropharyngeal exudate.   Eyes:      General:         Right eye: No discharge.         Left eye: No discharge.      Conjunctiva/sclera: Conjunctivae normal.      Pupils: Pupils are equal, round, and reactive to light.   Neck:      Vascular: No JVD.   Cardiovascular:      Rate and Rhythm: Normal rate and regular rhythm.      Pulses: Normal pulses.      Heart sounds: Normal heart sounds. No murmur heard.  Pulmonary:      Effort: Pulmonary effort is normal. No respiratory distress.      Breath sounds: Normal breath sounds. No wheezing or rales.   Abdominal:       General: Bowel sounds are normal. There is no distension.      Palpations: Abdomen is soft. There is no mass.      Tenderness: There is no abdominal tenderness. There is no guarding or rebound.   Musculoskeletal:         General: No tenderness. Normal range of motion.      Cervical back: Normal range of motion and neck supple. No rigidity or tenderness.      Right lower leg: No edema.      Left lower leg: No edema.   Lymphadenopathy:      Cervical: No cervical adenopathy.   Skin:     General: Skin is warm.      Coloration: Skin is not jaundiced or pale.      Findings: No rash.   Neurological:      Mental Status: She is alert and oriented to person, place, and time.         Assessment & Plan:   (Z00.00) Annual physical exam  (primary encounter diagnosis)  Plan: CBC With Differential With Platelet, Comp         Metabolic Panel (14), Hemoglobin A1C, Lipid         Panel, TSH W Reflex To Free T4        Routine labs ordered. She has apptment with her gyne for her papsmear.     (I10) Primary hypertension  Plan: she still plans to get pregnant so will need to continue on nifedipine ER for bp med.         (R00.2) Palpitation  Plan: Cardio Referral - Internal        Pt states still having intermittent palpitations; her ziopatch test normal and normal 2decho. I told her she needs to see cardiologist for further evaluation so referral given.       (J45.20) Mild intermittent asthma without complication (HCC)  Plan: currenlty controlled and not using her steroid inhaler.        Orders Placed This Encounter   Procedures    CBC With Differential With Platelet    Comp Metabolic Panel (14)    Hemoglobin A1C    Lipid Panel    TSH W Reflex To Free T4       Meds This Visit:  Requested Prescriptions      No prescriptions requested or ordered in this encounter       Imaging & Referrals:  CARDIO - INTERNAL

## 2024-04-14 ENCOUNTER — TELEPHONE (OUTPATIENT)
Dept: INTERNAL MEDICINE CLINIC | Facility: CLINIC | Age: 34
End: 2024-04-14

## 2024-04-14 DIAGNOSIS — Z00.00 ANNUAL PHYSICAL EXAM: ICD-10-CM

## 2024-04-14 DIAGNOSIS — D50.9 MICROCYTIC ANEMIA: Primary | ICD-10-CM

## 2024-04-14 LAB
DEPRECATED HBV CORE AB SER IA-ACNC: 9.5 NG/ML
IRON SATN MFR SERPL: 11 %
IRON SERPL-MCNC: 53 UG/DL
TIBC SERPL-MCNC: 504 UG/DL (ref 250–425)
TRANSFERRIN SERPL-MCNC: 338 MG/DL (ref 250–380)

## 2024-04-17 LAB
HGB A2 MFR BLD: 2.1 % (ref 1.5–3.5)
HGB PNL BLD ELPH: 97.9 % (ref 95.5–100)

## 2024-04-26 ENCOUNTER — LAB REQUISITION (OUTPATIENT)
Dept: LAB | Age: 34
End: 2024-04-26

## 2024-04-26 DIAGNOSIS — Z13.9 ENCOUNTER FOR SCREENING, UNSPECIFIED: ICD-10-CM

## 2024-04-26 PROCEDURE — PSEU8553 RUBEOLA IMMUNITY IGG: Performed by: CLINICAL MEDICAL LABORATORY

## 2024-04-26 PROCEDURE — 86765 RUBEOLA ANTIBODY: CPT | Performed by: CLINICAL MEDICAL LABORATORY

## 2024-04-29 LAB
MEV IGG SER IA-ACNC: 25.6 AU/ML
MEV IGG SER QL IA: NORMAL

## 2024-10-10 ENCOUNTER — NURSE TRIAGE (OUTPATIENT)
Dept: INTERNAL MEDICINE CLINIC | Facility: CLINIC | Age: 34
End: 2024-10-10

## 2024-10-10 ENCOUNTER — OFFICE VISIT (OUTPATIENT)
Dept: INTERNAL MEDICINE CLINIC | Facility: CLINIC | Age: 34
End: 2024-10-10
Payer: COMMERCIAL

## 2024-10-10 VITALS
HEIGHT: 60 IN | TEMPERATURE: 98 F | SYSTOLIC BLOOD PRESSURE: 130 MMHG | WEIGHT: 144.31 LBS | HEART RATE: 75 BPM | DIASTOLIC BLOOD PRESSURE: 76 MMHG | BODY MASS INDEX: 28.33 KG/M2 | OXYGEN SATURATION: 99 %

## 2024-10-10 DIAGNOSIS — M72.2 PLANTAR FASCIITIS, RIGHT: Primary | ICD-10-CM

## 2024-10-10 PROCEDURE — 3075F SYST BP GE 130 - 139MM HG: CPT | Performed by: INTERNAL MEDICINE

## 2024-10-10 PROCEDURE — 3008F BODY MASS INDEX DOCD: CPT | Performed by: INTERNAL MEDICINE

## 2024-10-10 PROCEDURE — 3078F DIAST BP <80 MM HG: CPT | Performed by: INTERNAL MEDICINE

## 2024-10-10 PROCEDURE — 99213 OFFICE O/P EST LOW 20 MIN: CPT | Performed by: INTERNAL MEDICINE

## 2024-10-10 NOTE — TELEPHONE ENCOUNTER
Dr. De La Rosa - please advise, patient only wants to see you and can come in after 415 pm today  Please reply to pool: EM RN TRIAGE  Action Requested: Summary for Provider     []  Critical Lab, Recommendations Needed  [x] Need Additional Advice  []   FYI    []   Need Orders  [] Need Medications Sent to Pharmacy  []  Other     SUMMARY: Right foot pain; middle of sole of foot--> now 5/10, can increase to 10/10 with prolonged activity and can radiate to ankle. Some warmth of site noted; mild swelling present. Same day visit advised--. Agreeable however no visit available and only wants to see Dr. De La Rosa; after 415 pm today. [See below for more details]    Reason for call: Foot Pain  Onset: chronic; present now    Reason for Disposition   Swollen foot  (Exceptions: Localized bump from bunions, calluses, insect bite, sting.)    Protocols used: Foot Pain-A-[place]      Patient called states she would like a referral for her right foot pain--> middle sole of foot; it can radiate to ankle, she has seen podiatrist in the past, but no longer at practice. Current pain of right foot is 5/10, it can be 10/10 when walking on it for most of the day. She is taking OTC pain medications, applying ice and ace wrap. Right foot is warmer than right foot. She reports some swelling at times, mild swelling is present. Denies any fever. Refer to system/assessment yes/no answers. She was advised same day visit--> she would prefer to see ONLY Dr. De La Rosa; she was made aware I will request same day visit, she can come into the office after 4:15 pm today. Home Care Advice discussed, per protocol. Patient instructed any new or worsening symptoms [reviewed] seek immediate medical attention. Patient verbalized understanding. No further questions or concerns at this time.

## 2024-10-10 NOTE — PROGRESS NOTES
Subjective:     Patient ID: Deborah Serrano is a 33 year old female.    Foot Pain   The pain is present in the right foot. This is a recurrent problem. The current episode started in the past 7 days. There has been no history of extremity trauma. The problem has been unchanged. The quality of the pain is described as aching. The pain is at a severity of 5/10. The pain is moderate. Pertinent negatives include no fever, joint swelling, limited range of motion or stiffness. The symptoms are aggravated by standing. She has tried rest, NSAIDS and cold for the symptoms. The treatment provided no relief.       History/Other:   Review of Systems   Constitutional: Negative.  Negative for fever.   Musculoskeletal:  Negative for stiffness.        Right foot pain     Current Outpatient Medications   Medication Sig Dispense Refill    Omeprazole 40 MG Oral Capsule Delayed Release       Albuterol Sulfate 1.25 MG/3ML Inhalation Nebu Soln Take 3 mL (1.25 mg total) by nebulization every 6 (six) hours as needed (12). 120 each 2    albuterol 108 (90 Base) MCG/ACT Inhalation Aero Soln Inhale 2 puffs into the lungs every 4 (four) hours as needed. 1 each 2    fluticasone furoate-vilanterol 100-25 MCG/ACT Inhalation Aerosol Powder, Breath Activated Inhale 1 puff into the lungs daily. 1 each 1    montelukast 10 MG Oral Tab Take 1 tablet (10 mg total) by mouth nightly. TAKE AT BEDTIME 90 tablet 1    ibuprofen 600 MG Oral Tab Take 1 tablet (600 mg total) by mouth every 8 (eight) hours. Take with food 90 tablet 0    NIFEdipine ER 30 MG Oral Tablet 24 Hr Take 1 tablet (30 mg total) by mouth daily. 90 tablet 1    Cholecalciferol 25 MCG (1000 UT) Oral Tab Take 1 tablet (1,000 Units total) by mouth daily.      cyclobenzaprine 10 MG Oral Tab Take 1 tablet (10 mg total) by mouth nightly. (Patient not taking: Reported on 10/10/2024) 14 tablet 0     Allergies:Allergies[1]    Past Medical History:    Allergic rhinitis    Asthma (HCC)     Essential hypertension    Hyperlipidemia    Prediabetes      History reviewed. No pertinent surgical history.   Family History   Problem Relation Age of Onset    Lipids Father     Hypertension Father     Diabetes Mother     Lipids Mother     Hypertension Mother     Diabetes Brother     Heart Disorder Brother         CAD at 42 /yo    Diabetes Maternal Aunt       Social History:   Social History     Socioeconomic History    Marital status: Single   Tobacco Use    Smoking status: Former     Types: Cigarettes     Passive exposure: Past    Smokeless tobacco: Never    Tobacco comments:     social smoking   Vaping Use    Vaping status: Former   Substance and Sexual Activity    Alcohol use: Yes     Comment: socially     Drug use: Never        Objective:   Physical Exam  Constitutional:       General: She is not in acute distress.     Appearance: She is not ill-appearing, toxic-appearing or diaphoretic.   Musculoskeletal:      Right lower leg: No edema.      Left lower leg: No edema.   Skin:     Coloration: Skin is not jaundiced or pale.   Neurological:      Mental Status: She is alert.         Assessment & Plan:   (M72.2) Plantar fasciitis, right  (primary encounter diagnosis)  Plan: Podiatry Referral - In Network        Patient does have a history of plantar fasciitis which is basically what she is presenting today.  I told her we will refer her back to podiatrist.  For now she may continue to use cold compress by using frozen water bottle massaging her foot, also use heel questions.       No orders of the defined types were placed in this encounter.      Meds This Visit:  Requested Prescriptions      No prescriptions requested or ordered in this encounter       Imaging & Referrals:  None          [1]   Allergies  Allergen Reactions    Amoxicillin HIVES, ITCHING, RASH, SWELLING and WHEEZING    Cat Hair Extract Coughing, ITCHING, RASH, SHORTNESS OF BREATH, SWELLING and WHEEZING    Latex ITCHING, RASH and SWELLING    Shrimp  Flavor SWELLING     Some seafood     Seafood ITCHING and RASH

## 2024-10-10 NOTE — TELEPHONE ENCOUNTER
Patient was called and inform of  message below appointment was made for today at 4:45. Other appointment was cancelled.    Future Appointments   Date Time Provider Department Center   10/10/2024  4:45 PM Sarbjit De La Rosa MD [facility]

## 2024-10-11 ENCOUNTER — TELEPHONE (OUTPATIENT)
Dept: INTERNAL MEDICINE CLINIC | Facility: CLINIC | Age: 34
End: 2024-10-11

## 2024-10-11 DIAGNOSIS — M72.2 PLANTAR FASCIITIS, RIGHT: Primary | ICD-10-CM

## 2024-10-11 NOTE — TELEPHONE ENCOUNTER
Dr De La Rosa==see message and advise, thanks.    OFFICE VISIT note 10/10/24;  Assessment & Plan:  (M72.2) Plantar fasciitis, right  (primary encounter diagnosis)  Plan: Podiatry Referral - In Network        Patient does have a history of plantar fasciitis which is basically what she is presenting today.  I told her we will refer her back to podiatrist.  For now she may continue to use cold compress by using frozen water bottle massaging her foot, also use heel questions.      Future Appointments   Date Time Provider Department Center   10/25/2024  9:50 AM Uday Eid MD [facility]   11/12/2024  9:30 AM Danny Arthur DPM [id number]

## 2024-10-11 NOTE — TELEPHONE ENCOUNTER
Patient sent a Loudeye message to provider as follows:    Pedro De La Rosa good afternoon,   I made the appointment to see the podiatrist the one you referred me to but it’s not until November 12. I don’t think I can wait until that day but hopefully there’s a cancellation for a sooner date to see him. I do have a MD physiatrist in house that does corticosteroids injections to our residents which he may be able to give it to me only if I have the prescription order in hand for the medication to be administered. Not sure if it’s possible to get a prescription for that?     Patient had appointment with provider on 10/10/24.    Please advise.

## 2024-10-14 ENCOUNTER — OFFICE VISIT (OUTPATIENT)
Dept: PODIATRY CLINIC | Facility: CLINIC | Age: 34
End: 2024-10-14
Payer: COMMERCIAL

## 2024-10-14 DIAGNOSIS — M79.671 RIGHT FOOT PAIN: Primary | ICD-10-CM

## 2024-10-14 DIAGNOSIS — M79.671 ARCH PAIN, RIGHT: ICD-10-CM

## 2024-10-14 DIAGNOSIS — M72.2 PLANTAR FASCIITIS OF RIGHT FOOT: ICD-10-CM

## 2024-10-14 PROCEDURE — 99213 OFFICE O/P EST LOW 20 MIN: CPT | Performed by: PODIATRIST

## 2024-10-14 RX ORDER — METHYLPREDNISOLONE 4 MG/1
TABLET ORAL
Qty: 21 TABLET | Refills: 0 | Status: SHIPPED | OUTPATIENT
Start: 2024-10-14

## 2024-10-25 ENCOUNTER — OFFICE VISIT (OUTPATIENT)
Dept: OBGYN CLINIC | Facility: CLINIC | Age: 34
End: 2024-10-25
Payer: COMMERCIAL

## 2024-10-25 VITALS
HEIGHT: 60 IN | WEIGHT: 142 LBS | BODY MASS INDEX: 27.88 KG/M2 | DIASTOLIC BLOOD PRESSURE: 83 MMHG | SYSTOLIC BLOOD PRESSURE: 130 MMHG

## 2024-10-25 DIAGNOSIS — Z01.419 WOMEN'S ANNUAL ROUTINE GYNECOLOGICAL EXAMINATION: Primary | ICD-10-CM

## 2024-10-25 DIAGNOSIS — Z11.3 SCREEN FOR STD (SEXUALLY TRANSMITTED DISEASE): ICD-10-CM

## 2024-10-25 DIAGNOSIS — N97.9 PRIMARY FEMALE INFERTILITY: ICD-10-CM

## 2024-10-25 DIAGNOSIS — Z32.02 PREGNANCY EXAMINATION OR TEST, NEGATIVE RESULT: ICD-10-CM

## 2024-10-25 DIAGNOSIS — Z12.4 SCREENING FOR CERVICAL CANCER: ICD-10-CM

## 2024-10-25 LAB
CONTROL LINE PRESENT WITH A CLEAR BACKGROUND (YES/NO): YES YES/NO
KIT LOT #: NORMAL NUMERIC
PREGNANCY TEST, URINE: NEGATIVE

## 2024-10-25 PROCEDURE — 81025 URINE PREGNANCY TEST: CPT | Performed by: OBSTETRICS & GYNECOLOGY

## 2024-10-25 PROCEDURE — 99395 PREV VISIT EST AGE 18-39: CPT | Performed by: OBSTETRICS & GYNECOLOGY

## 2024-10-25 PROCEDURE — 99213 OFFICE O/P EST LOW 20 MIN: CPT | Performed by: OBSTETRICS & GYNECOLOGY

## 2024-10-25 PROCEDURE — 3075F SYST BP GE 130 - 139MM HG: CPT | Performed by: OBSTETRICS & GYNECOLOGY

## 2024-10-25 PROCEDURE — 3079F DIAST BP 80-89 MM HG: CPT | Performed by: OBSTETRICS & GYNECOLOGY

## 2024-10-25 PROCEDURE — 3008F BODY MASS INDEX DOCD: CPT | Performed by: OBSTETRICS & GYNECOLOGY

## 2024-10-25 NOTE — PROGRESS NOTES
HPI:   Deborah Serrano is a 33 year old female who presents for an     1)  annual/pap. Well woman counseling.  Self breast exam explained. Health maintenance. Body mass index is 27.73 kg/m²., recommended low fat diet and aerobic exercise 30 minutes three times weekly.  The patient indicates understanding of these issues and agrees to the plan.  The patient is asked to return for an annual visit.      2) primary infertility.  33-year-old female who has seen Dr. Zurita in consultation.  Patient stated that she had purchased sperm from a sperm bank and has done intravaginal injection at home to try to get pregnant from this the patient stated that she had spoken to Dr. Zurita about that and that Dr. Zurita's practice does not work with the sperm that she had purchased it from.  Patient was counseled on following recommendations of Dr. Zurita/reproductive endocrinology in regards to infertility treatment.  Patient stated she is going to try 1 more at home intravaginal sperm bank use after which she is returning Dr. Zurita for further management if she does not get pregnant.  Patient counseled and all questions were answered      Wt Readings from Last 6 Encounters:   10/25/24 142 lb (64.4 kg)   10/10/24 144 lb 4.8 oz (65.5 kg)   04/12/24 140 lb 4.8 oz (63.6 kg)   03/07/24 138 lb 14.4 oz (63 kg)   12/12/23 146 lb (66.2 kg)   11/10/23 146 lb 8 oz (66.5 kg)     Body mass index is 27.73 kg/m².    Cholesterol, Total (mg/dL)   Date Value   04/12/2024 194   03/15/2023 184     HDL Cholesterol (mg/dL)   Date Value   04/12/2024 46   03/15/2023 48     LDL Cholesterol (mg/dL)   Date Value   04/12/2024 128 (H)   03/15/2023 121 (H)     AST (U/L)   Date Value   04/12/2024 18   03/15/2023 20     ALT (U/L)   Date Value   04/12/2024 19   03/15/2023 32        Current Outpatient Medications   Medication Sig Dispense Refill    methylPREDNISolone 4 MG Oral Tablet Therapy Pack Take per package insert (instructions). 21 tablet 0     Omeprazole 40 MG Oral Capsule Delayed Release       Albuterol Sulfate 1.25 MG/3ML Inhalation Nebu Soln Take 3 mL (1.25 mg total) by nebulization every 6 (six) hours as needed (12). 120 each 2    albuterol 108 (90 Base) MCG/ACT Inhalation Aero Soln Inhale 2 puffs into the lungs every 4 (four) hours as needed. 1 each 2    fluticasone furoate-vilanterol 100-25 MCG/ACT Inhalation Aerosol Powder, Breath Activated Inhale 1 puff into the lungs daily. 1 each 1    montelukast 10 MG Oral Tab Take 1 tablet (10 mg total) by mouth nightly. TAKE AT BEDTIME 90 tablet 1    ibuprofen 600 MG Oral Tab Take 1 tablet (600 mg total) by mouth every 8 (eight) hours. Take with food 90 tablet 0    cyclobenzaprine 10 MG Oral Tab Take 1 tablet (10 mg total) by mouth nightly. 14 tablet 0    NIFEdipine ER 30 MG Oral Tablet 24 Hr Take 1 tablet (30 mg total) by mouth daily. 90 tablet 1    Cholecalciferol 25 MCG (1000 UT) Oral Tab Take 1 tablet (1,000 Units total) by mouth daily.        Past Medical History:    Allergic rhinitis    Asthma (HCC)    Essential hypertension    Hyperlipidemia    Prediabetes      History reviewed. No pertinent surgical history.   Family History   Problem Relation Age of Onset    Lipids Father     Hypertension Father     Diabetes Mother     Lipids Mother     Hypertension Mother     Diabetes Brother     Heart Disorder Brother         CAD at 42 /yo    Diabetes Maternal Aunt       Social History:   Social History     Socioeconomic History    Marital status: Single   Tobacco Use    Smoking status: Former     Types: Cigarettes     Passive exposure: Past    Smokeless tobacco: Never    Tobacco comments:     social smoking   Vaping Use    Vaping status: Former   Substance and Sexual Activity    Alcohol use: Yes     Comment: socially     Drug use: Never            REVIEW OF SYSTEMS:   GENERAL: feels well otherwise  SKIN: denies any unusual skin lesions  EYES:denies blurred vision or double vision  HEENT: denies nasal congestion,  sinus pain or ST  LUNGS: denies shortness of breath with exertion  CARDIOVASCULAR: denies chest pain on exertion  GI: denies abdominal pain,denies heartburn  : denies dysuria, vaginal discharge or itching,periods regular   MUSCULOSKELETAL: denies back pain  NEURO: denies headaches  PSYCHE: denies depression or anxiety  HEMATOLOGIC: denies hx of anemia  ENDOCRINE: denies thyroid history  ALL/ASTHMA: denies hx of allergy or asthma    EXAM:   /88   Ht 5' (1.524 m)   Wt 142 lb (64.4 kg)   LMP 10/18/2024 (Approximate)   BMI 27.73 kg/m²   Body mass index is 27.73 kg/m².   GENERAL: well developed, well nourished,in no apparent distress  SKIN: no rashes,no suspicious lesions  HEENT: atraumatic, normocephalic  EYES:normal in appearance  NECK: supple,no adenopathy  CHEST: no chest tenderness  BREAST: no dominant or suspicious mass  LUNGS: clear to auscultation  CARDIO: RRR without murmur  GI: good BS's,no masses, HSM or tenderness  :introitus is normal,scant discharge,cervix is pink,no adnexal masses or tenderness, PAP was done  gc/chl done    MUSCULOSKELETAL: back is not tender,FROM of the back  EXTREMITIES: no cyanosis, clubbing or edema  NEURO: Oriented times three      ASSESSMENT AND PLAN:   Deborah Serrano is a 33 year old female who presents for an    1)  annual/pap. Well woman counseling.  Self breast exam explained. Health maintenance. Body mass index is 27.73 kg/m²., recommended low fat diet and aerobic exercise 30 minutes three times weekly.  The patient indicates understanding of these issues and agrees to the plan.  The patient is asked to return for an annual visit.      2) primary infertility.  33-year-old female who has seen Dr. Zurita in consultation.  Patient stated that she had purchased sperm from a sperm bank and has done intravaginal injection at home to try to get pregnant from this the patient stated that she had spoken to Dr. Zurita about that and that Dr. Zurita's practice does not  work with the sperm that she had purchased it from.  Patient was counseled on following recommendations of Dr. Zurita/reproductive endocrinology in regards to infertility treatment.  Patient stated she is going to try 1 more at home intravaginal sperm bank use after which she is returning Dr. Zurita for further management if she does not get pregnant.  Patient counseled and all questions were answered

## 2024-10-26 NOTE — PROGRESS NOTES
Deborah Serrano is a 33 year old female.   Chief Complaint   Patient presents with    Foot Pain     R foot f/u-  was seen by Dr. Grayson on 4/05/2024- has MRI taken on 11/30/2023 in the system- stated pain returned 2 wks ago- rates pain 6-8/10 most of the time         HPI:   This pleasant patient presents complaining of right foot pain she was previously seen in April she has had an MRI taken this in the system she stated the pain returned approximately 2 weeks ago and its basically 6-8 out of 10 in the bottom of her right heel.  And her arch.  At today's visit reviewed nurse's history as taken above, allergies medications and medical history as documented below.  All changes duly noted  Allergies: Amoxicillin, Cat hair extract, Latex, Shrimp flavor, and Seafood   Current Outpatient Medications   Medication Sig Dispense Refill    methylPREDNISolone 4 MG Oral Tablet Therapy Pack Take per package insert (instructions). 21 tablet 0    Omeprazole 40 MG Oral Capsule Delayed Release       Albuterol Sulfate 1.25 MG/3ML Inhalation Nebu Soln Take 3 mL (1.25 mg total) by nebulization every 6 (six) hours as needed (12). 120 each 2    albuterol 108 (90 Base) MCG/ACT Inhalation Aero Soln Inhale 2 puffs into the lungs every 4 (four) hours as needed. 1 each 2    fluticasone furoate-vilanterol 100-25 MCG/ACT Inhalation Aerosol Powder, Breath Activated Inhale 1 puff into the lungs daily. 1 each 1    montelukast 10 MG Oral Tab Take 1 tablet (10 mg total) by mouth nightly. TAKE AT BEDTIME 90 tablet 1    ibuprofen 600 MG Oral Tab Take 1 tablet (600 mg total) by mouth every 8 (eight) hours. Take with food 90 tablet 0    cyclobenzaprine 10 MG Oral Tab Take 1 tablet (10 mg total) by mouth nightly. 14 tablet 0    NIFEdipine ER 30 MG Oral Tablet 24 Hr Take 1 tablet (30 mg total) by mouth daily. 90 tablet 1    Cholecalciferol 25 MCG (1000 UT) Oral Tab Take 1 tablet (1,000 Units total) by mouth daily.        Past Medical  History:    Allergic rhinitis    Asthma (HCC)    Essential hypertension    Hyperlipidemia    Prediabetes      History reviewed. No pertinent surgical history.   Family History   Problem Relation Age of Onset    Lipids Father     Hypertension Father     Diabetes Mother     Lipids Mother     Hypertension Mother     Diabetes Brother     Heart Disorder Brother         CAD at 42 /yo    Diabetes Maternal Aunt       Social History     Socioeconomic History    Marital status: Single   Tobacco Use    Smoking status: Former     Types: Cigarettes     Passive exposure: Past    Smokeless tobacco: Never    Tobacco comments:     social smoking   Vaping Use    Vaping status: Former   Substance and Sexual Activity    Alcohol use: Yes     Comment: socially     Drug use: Never           REVIEW OF SYSTEMS:   Today reviewed systens as documented below  GENERAL HEALTH: feels well otherwise  SKIN: Refer to exam below  RESPIRATORY: denies shortness of breath with exertion  CARDIOVASCULAR: denies chest pain on exertion  GI: denies abdominal pain and denies heartburn  NEURO: denies headaches    EXAM:   LMP 09/10/2024 (Approximate)   GENERAL: well developed, well nourished, in no apparent distress  EXTREMITIES:   1. Integument: The skin on her foot was evaluated is warm and dry on the right   2. Vascular: Has palpable dorsalis pedis posterior tibial pulses on the right   3. Neurologic: Remains intact   4. Musculoskeletal: Has pain not only in the heel but also in the plantar fascia.  This more distinctly in the central portion of the plantar fascia but distal to the heel.  X-rays were taken 2 views weightbearing of the right foot no evidence of heel spur.  I shows a 6 cm cystic lesion within the plantar foot muscular suggestive of a large ganglion cyst or venous varix there is no origin visualized  ASSESSMENT AND PLAN:   Diagnoses and all orders for this visit:    Right foot pain  -     PHYSICAL THERAPY - INTERNAL  -     methylPREDNISolone 4  MG Oral Tablet Therapy Pack; Take per package insert (instructions).  -     [facility]    Plantar fasciitis of right foot  -     PHYSICAL THERAPY - INTERNAL  -     methylPREDNISolone 4 MG Oral Tablet Therapy Pack; Take per package insert (instructions).  -     [facility]    Arch pain, right  -     PHYSICAL THERAPY - INTERNAL  -     methylPREDNISolone 4 MG Oral Tablet Therapy Pack; Take per package insert (instructions).  -     [facility]    Other orders  -     [facility] AMB POD XR - RT FOOT 2 VIEWS(AP, LATERAL)WT BEARING        Plan: Today I took a great length of time educating the patient about the problem we will start her on a Medrol Dosepak get her involved in physical therapy because this is now recurrent at this time I think it is predominantly a plantar fasciitis we will proceed with a Medrol Dosepak physical therapy as well as orthotic management.  If it is consistently painful then we may have to go and excise the lesion.  But a few weeks we will see how she is doing.    The patient indicates understanding of these issues and agrees to the plan.    Danny Arthur, MARY

## 2024-10-28 LAB
C TRACH DNA SPEC QL NAA+PROBE: NEGATIVE
HPV E6+E7 MRNA CVX QL NAA+PROBE: NEGATIVE
N GONORRHOEA DNA SPEC QL NAA+PROBE: NEGATIVE

## 2024-10-29 PROBLEM — N97.9 PRIMARY FEMALE INFERTILITY: Status: ACTIVE | Noted: 2024-10-29

## 2025-01-07 RX ORDER — NIFEDIPINE 30 MG
30 TABLET, EXTENDED RELEASE ORAL DAILY
Qty: 90 TABLET | Refills: 1 | Status: SHIPPED | OUTPATIENT
Start: 2025-01-07

## 2025-01-07 NOTE — TELEPHONE ENCOUNTER
Patient states that she has been cutting nifedipine in half and takes 15 mg. She is out of medication. I was unable to change the directions to take half.     30 mg pended for your review and approval.

## 2025-01-08 RX ORDER — NIFEDIPINE 30 MG
30 TABLET, EXTENDED RELEASE ORAL DAILY
Qty: 90 TABLET | Refills: 1 | OUTPATIENT
Start: 2025-01-08

## 2025-01-08 NOTE — TELEPHONE ENCOUNTER
Spoke to patient (verified Name and ) and relayed provider's message below. Patient verbalized understanding. States she picked up the medication last night. No further questions or concerns at this time.

## 2025-01-10 RX ORDER — NIFEDIPINE 30 MG
30 TABLET, EXTENDED RELEASE ORAL DAILY
Qty: 90 TABLET | Refills: 1 | OUTPATIENT
Start: 2025-01-10

## 2025-03-23 LAB — AMB EXT COVID-19 RESULT: DETECTED

## 2025-03-25 ENCOUNTER — PATIENT MESSAGE (OUTPATIENT)
Dept: INTERNAL MEDICINE CLINIC | Facility: CLINIC | Age: 35
End: 2025-03-25

## 2025-03-25 DIAGNOSIS — J45.21 MILD INTERMITTENT ASTHMA WITH EXACERBATION (HCC): ICD-10-CM

## 2025-03-26 NOTE — TELEPHONE ENCOUNTER
[employer] message sent with instruction.   Infection banner updated and CD isolation guidelines provided.     LAST VISIT 10/10/24.   No future appointments.

## 2025-03-29 RX ORDER — MONTELUKAST SODIUM 10 MG/1
10 TABLET ORAL NIGHTLY
Qty: 90 TABLET | Refills: 1 | Status: SHIPPED | OUTPATIENT
Start: 2025-03-29

## 2025-03-31 ENCOUNTER — NURSE TRIAGE (OUTPATIENT)
Dept: INTERNAL MEDICINE CLINIC | Facility: CLINIC | Age: 35
End: 2025-03-31

## 2025-03-31 NOTE — TELEPHONE ENCOUNTER
See [employer] message below. Patient reports blood pressure is still elevated since having covid. Denies any recent increase triggers of stress. She is already back to work and feeling better but today /100 P 89. +frontal headache and pain in the back of her neck. She states these are common symptoms for her when BP is elevated. No change in vision, no dizziness, no nausea, no chest pain. Per protocol see in office in 2 weeks no openings for you until end of April.       Hi good morning,  Yes, since I came back from my vacation I’ve been taking it daily. Doc is aware that there’s days where I have to take 1/2 d/t the s/e I get palpitations, dizziness from it. Lately, I’ve been taking full dose. BP readings   3/22- /110 after taking the med rechecked 143/95. 3/23- /101 /96, 3/24- /109 /106  That day i took 1 1/2 bc it was too high & then it dropped to 100/68 brought it back up a little by hydration & positioning. 3/25- /99 /90, 3/26- /90 /96, 3/27- /99 /90, 3/28- /93 /100 it was a stressful day at work i did take 1/2 a dose. 3/29- /90. Also feeling much better w/ covid s/s.       Reason for Disposition   Systolic BP >= 130 OR Diastolic >= 80, and is taking BP medications    Protocols used: Blood Pressure - High-A-OH

## 2025-04-03 ENCOUNTER — TELEPHONE (OUTPATIENT)
Dept: INTERNAL MEDICINE CLINIC | Facility: CLINIC | Age: 35
End: 2025-04-03

## 2025-04-03 ENCOUNTER — OFFICE VISIT (OUTPATIENT)
Dept: INTERNAL MEDICINE CLINIC | Facility: CLINIC | Age: 35
End: 2025-04-03
Payer: COMMERCIAL

## 2025-04-03 VITALS
BODY MASS INDEX: 28 KG/M2 | WEIGHT: 142.56 LBS | HEART RATE: 80 BPM | DIASTOLIC BLOOD PRESSURE: 100 MMHG | SYSTOLIC BLOOD PRESSURE: 142 MMHG

## 2025-04-03 DIAGNOSIS — Z00.00 ANNUAL PHYSICAL EXAM: Primary | ICD-10-CM

## 2025-04-03 DIAGNOSIS — U07.1 COVID-19 VIRUS INFECTION: ICD-10-CM

## 2025-04-03 DIAGNOSIS — I10 PRIMARY HYPERTENSION: Primary | ICD-10-CM

## 2025-04-03 PROCEDURE — 99214 OFFICE O/P EST MOD 30 MIN: CPT | Performed by: INTERNAL MEDICINE

## 2025-04-03 RX ORDER — OMEPRAZOLE 20 MG/1
20 CAPSULE, DELAYED RELEASE ORAL
Qty: 30 CAPSULE | Refills: 0 | Status: SHIPPED | OUTPATIENT
Start: 2025-04-03

## 2025-04-03 RX ORDER — LISINOPRIL 5 MG/1
5 TABLET ORAL DAILY
Qty: 90 TABLET | Refills: 0 | Status: SHIPPED | OUTPATIENT
Start: 2025-04-03

## 2025-04-03 NOTE — PROGRESS NOTES
Subjective:     Patient ID: Deborah Serrano is a 34 year old female.    Hypertension  This is a chronic problem. The current episode started more than 1 year ago. The problem has been waxing and waning since onset. The problem is uncontrolled. Associated symptoms include palpitations. Pertinent negatives include no chest pain, peripheral edema or shortness of breath. There are no associated agents to hypertension. There are no known risk factors for coronary artery disease. Past treatments include calcium channel blockers. The current treatment provides mild improvement. There are no compliance problems.  There is no history of angina, kidney disease, CAD/MI, CVA, heart failure or PVD. There is no history of chronic renal disease, a hypertension causing med or a thyroid problem.       History/Other:   Review of Systems   Constitutional: Negative.    Respiratory:  Negative for shortness of breath.    Cardiovascular:  Positive for palpitations. Negative for chest pain and leg swelling.   Gastrointestinal: Negative.    Genitourinary: Negative.      Current Outpatient Medications   Medication Sig Dispense Refill    montelukast 10 MG Oral Tab Take 1 tablet (10 mg total) by mouth nightly. TAKE AT BEDTIME 90 tablet 1    NIFEDIPINE ER 30 MG Oral Tablet 24 Hr TAKE ONE TABLET BY MOUTH ONE TIME DAILY 90 tablet 1    Omeprazole 40 MG Oral Capsule Delayed Release       Albuterol Sulfate 1.25 MG/3ML Inhalation Nebu Soln Take 3 mL (1.25 mg total) by nebulization every 6 (six) hours as needed (12). 120 each 2    albuterol 108 (90 Base) MCG/ACT Inhalation Aero Soln Inhale 2 puffs into the lungs every 4 (four) hours as needed. 1 each 2    fluticasone furoate-vilanterol 100-25 MCG/ACT Inhalation Aerosol Powder, Breath Activated Inhale 1 puff into the lungs daily. 1 each 1    ibuprofen 600 MG Oral Tab Take 1 tablet (600 mg total) by mouth every 8 (eight) hours. Take with food 90 tablet 0    cyclobenzaprine 10 MG Oral Tab Take 1  tablet (10 mg total) by mouth nightly. 14 tablet 0    Cholecalciferol 25 MCG (1000 UT) Oral Tab Take 1 tablet (1,000 Units total) by mouth daily.       Allergies:Allergies[1]    Past Medical History:    Allergic rhinitis    Asthma (HCC)    Essential hypertension    Hyperlipidemia    Prediabetes      History reviewed. No pertinent surgical history.   Family History   Problem Relation Age of Onset    Lipids Father     Hypertension Father     Diabetes Mother     Lipids Mother     Hypertension Mother     Diabetes Brother     Heart Disorder Brother         CAD at 42 /yo    Diabetes Maternal Aunt       Social History:   Social History     Socioeconomic History    Marital status: Single   Tobacco Use    Smoking status: Former     Types: Cigarettes     Passive exposure: Past    Smokeless tobacco: Never    Tobacco comments:     social smoking   Vaping Use    Vaping status: Former   Substance and Sexual Activity    Alcohol use: Yes     Comment: socially     Drug use: Never        Objective:   Physical Exam  Constitutional:       General: She is not in acute distress.     Appearance: She is well-developed. She is not ill-appearing, toxic-appearing or diaphoretic.   HENT:      Head: Normocephalic and atraumatic.      Right Ear: External ear normal.      Left Ear: External ear normal.      Nose: Nose normal.      Mouth/Throat:      Pharynx: No oropharyngeal exudate.   Eyes:      General: No scleral icterus.        Right eye: No discharge.         Left eye: No discharge.      Conjunctiva/sclera: Conjunctivae normal.      Pupils: Pupils are equal, round, and reactive to light.   Neck:      Vascular: No JVD.   Cardiovascular:      Rate and Rhythm: Normal rate and regular rhythm.      Pulses: Normal pulses.      Heart sounds: Normal heart sounds. No murmur heard.     No gallop.   Pulmonary:      Effort: Pulmonary effort is normal. No respiratory distress.      Breath sounds: Normal breath sounds. No wheezing or rales.   Abdominal:       General: Bowel sounds are normal. There is no distension.      Palpations: Abdomen is soft. There is no mass.      Tenderness: There is no abdominal tenderness. There is no guarding or rebound.   Musculoskeletal:         General: No tenderness. Normal range of motion.      Cervical back: Normal range of motion and neck supple. No rigidity or tenderness.      Right lower leg: No edema.      Left lower leg: No edema.   Lymphadenopathy:      Cervical: No cervical adenopathy.   Skin:     General: Skin is warm and dry.      Coloration: Skin is not jaundiced or pale.      Findings: No rash.   Neurological:      Mental Status: She is alert and oriented to person, place, and time.         Assessment & Plan:   (I10) Primary hypertension  (primary encounter diagnosis)  Plan: pt's bp elevated but she had stopped taking nifedipine since causing palpitation. Pt has aske to go back to lisinoprol  5mg daily since had controlleed herbp before and no side effects; pt also  not pregnant nor does she have plans to get pregnant ; I told her the risk of congenital defect of baby if she gets pregnant and she states not going to happen. Will then restart lisinipril 5mg daily; rtc in 4 weeis.     (U07.1) COVID-19 virus infection  Plan: pt recovering already from covid;        No orders of the defined types were placed in this encounter.      Meds This Visit:  Requested Prescriptions      No prescriptions requested or ordered in this encounter       Imaging & Referrals:  None            [1]   Allergies  Allergen Reactions    Amoxicillin HIVES, ITCHING, RASH, SWELLING and WHEEZING    Cat Hair Extract Coughing, ITCHING, RASH, SHORTNESS OF BREATH, SWELLING and WHEEZING    Latex ITCHING, RASH and SWELLING    Shrimp Flavor SWELLING     Some seafood     Seafood ITCHING and RASH

## 2025-05-06 RX ORDER — OMEPRAZOLE 20 MG/1
20 CAPSULE, DELAYED RELEASE ORAL
Qty: 30 CAPSULE | Refills: 0 | Status: SHIPPED | OUTPATIENT
Start: 2025-05-06

## 2025-07-01 RX ORDER — NIFEDIPINE 30 MG
30 TABLET, EXTENDED RELEASE ORAL DAILY
Qty: 90 TABLET | Refills: 0 | Status: SHIPPED | OUTPATIENT
Start: 2025-07-01

## 2025-07-01 NOTE — TELEPHONE ENCOUNTER
Please review. Refill failed protocol.     The original prescription was discontinued on 4/5/2025 by Sarbjit De La Rosa MD for the following reason: Patient discontinued

## 2025-07-02 RX ORDER — LISINOPRIL 5 MG/1
5 TABLET ORAL DAILY
Qty: 90 TABLET | Refills: 0 | Status: SHIPPED | OUTPATIENT
Start: 2025-07-02

## (undated) NOTE — LETTER
12/15/2023          To Whom It May Concern:    Darion Garces is currently under my medical care. Activity is restricted as follows: Desk duty only for 1 month. If you require additional information please contact our office.         Sincerely,    Grisel Childers DPM

## (undated) NOTE — LETTER
12/11/2023          To Whom It May Concern:    Carolyn See is currently under my medical care. 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 These restrictions should be in place through 12/15/23. She has follow up appointment at this date and further restrictions will be given at that time. If you require additional information please contact our office.         Sincerely,    Marko Del Toro DPM

## (undated) NOTE — LETTER
3/7/2024              Deborah Serrano        [address] [facility] [address]         To whom it may concern,    This is to certify that Deborah Serrano was seen and examined today. She may return to work starting March 8, 2024 on light duty for one week.       Sincerely,    Sarbjit De La Rosa MD  [address] [address] [phone]        Document electronically generated by:  Sarbjit De La Rosa MD

## (undated) NOTE — LETTER
10/27/2023              José Antonio Barnett [address]         To Whom it may concern:     This is to certify that Salina Larsen had an appointment on 10/27/2023 at 8:30 AM with Lester Schuster DPM.        Sincerely,    Lester Schuster DPM  [facility], [address], [place]  [address] [place] [address] [phone]

## (undated) NOTE — LETTER
AUTHORIZATION FOR SURGICAL OPERATION OR OTHER PROCEDURE    1. I hereby authorize Dr. Migel Luque, and [facility] staff assigned to my case to perform the following operation and/or procedure at the [facility], [facility]:    _______________________________________________________________________________________________    Cortisone Injection to Right foot  _______________________________________________________________________________________________    2. My physician has explained the nature and purpose of the operation or other procedure, possible alternative methods of treatment, the risks involved, and the possibility of complication to me. I acknowledge that no guarantee has been made as to the result that may be obtained. 3.  I recognize that, during the course of this operation, or other procedure, unforseen conditions may necessitate additional or different procedure than those listed above. I, therefore, further authorize and request that the above named physician, his/her physician assistants or designees perform such procedures as are, in his/her professional opinion, necessary and desirable. 4.  Any tissue or organs removed in the operation or other procedure may be disposed of by and at the discretion of the [facility], [facility] and [facility] AT [facility] in [place]. 5.  I understand that in the event of a medical emergency, I will be transported by local paramedics to [facility] or other hospital emergency department. 6.  I certify that I have read and fully understand the above consent to operation and/or other procedure. 7.  I acknowledge that my physician has explained sedation/analgesia administration to me including the risks and benefits. I consent to the administration of sedation/analgesia as may be necessary or desirable in the judgement of my physician.     Witness signature: ___________________________________________________ Date:  ______/______/_____ Time:  ________ A. M.  P.M. Patient Name:  ______________________________________________________  (please print)      Patient signature:  ___________________________________________________             Relationship to Patient:           []  Parent    Responsible person                          []  Spouse  In case of minor or                    [] Other  _____________   Incompetent name:  __________________________________________________                               (please print)      _____________      Responsible person  In case of minor or  Incompetent signature:  _______________________________________________    Statement of Physician  My signature below affirms that prior to the time of the procedure, I have explained to the patient and/or his/her guardian, the risks and benefits involved in the proposed treatment and any reasonable alternative to the proposed treatment. I have also explained the risks and benefits involved in the refusal of the proposed treatment and have answered the patient's questions.                         Date:  ______/______/_______  Provider                      Signature:  __________________________________________________________       Time:  ___________ A.M    P.M.